# Patient Record
Sex: MALE | Race: BLACK OR AFRICAN AMERICAN | NOT HISPANIC OR LATINO | Employment: UNEMPLOYED | URBAN - METROPOLITAN AREA
[De-identification: names, ages, dates, MRNs, and addresses within clinical notes are randomized per-mention and may not be internally consistent; named-entity substitution may affect disease eponyms.]

---

## 2024-11-13 ENCOUNTER — HOSPITAL ENCOUNTER (OUTPATIENT)
Facility: HOSPITAL | Age: 54
Setting detail: OBSERVATION
Discharge: HOME | End: 2024-11-14
Attending: EMERGENCY MEDICINE | Admitting: STUDENT IN AN ORGANIZED HEALTH CARE EDUCATION/TRAINING PROGRAM
Payer: OTHER GOVERNMENT

## 2024-11-13 ENCOUNTER — APPOINTMENT (OUTPATIENT)
Dept: GENERAL RADIOLOGY | Facility: HOSPITAL | Age: 54
End: 2024-11-13
Payer: OTHER GOVERNMENT

## 2024-11-13 ENCOUNTER — APPOINTMENT (OUTPATIENT)
Dept: CT IMAGING | Facility: HOSPITAL | Age: 54
End: 2024-11-13
Payer: OTHER GOVERNMENT

## 2024-11-13 DIAGNOSIS — T40.601A OPIATE OVERDOSE, ACCIDENTAL OR UNINTENTIONAL, INITIAL ENCOUNTER: Primary | ICD-10-CM

## 2024-11-13 DIAGNOSIS — S01.81XA LACERATION OF FOREHEAD, INITIAL ENCOUNTER: ICD-10-CM

## 2024-11-13 DIAGNOSIS — R55 SYNCOPE, UNSPECIFIED SYNCOPE TYPE: ICD-10-CM

## 2024-11-13 DIAGNOSIS — M54.50 ACUTE MIDLINE LOW BACK PAIN WITHOUT SCIATICA: ICD-10-CM

## 2024-11-13 LAB
ALBUMIN SERPL-MCNC: 3.6 G/DL (ref 3.5–5.2)
ALBUMIN/GLOB SERPL: 1 G/DL
ALP SERPL-CCNC: 78 U/L (ref 39–117)
ALT SERPL W P-5'-P-CCNC: 17 U/L (ref 1–41)
AMPHET+METHAMPHET UR QL: NEGATIVE
ANION GAP SERPL CALCULATED.3IONS-SCNC: 10.4 MMOL/L (ref 5–15)
APAP SERPL-MCNC: <5 MCG/ML (ref 0–30)
ARTERIAL PATENCY WRIST A: POSITIVE
AST SERPL-CCNC: 27 U/L (ref 1–40)
ATMOSPHERIC PRESS: 749.6 MMHG
BACTERIA UR QL AUTO: ABNORMAL /HPF
BARBITURATES UR QL SCN: NEGATIVE
BASE EXCESS BLDA CALC-SCNC: 4.8 MMOL/L (ref 0–2)
BASOPHILS # BLD MANUAL: 0 10*3/MM3 (ref 0–0.2)
BASOPHILS NFR BLD MANUAL: 0 % (ref 0–1.5)
BDY SITE: ABNORMAL
BENZODIAZ UR QL SCN: NEGATIVE
BILIRUB SERPL-MCNC: 0.2 MG/DL (ref 0–1.2)
BILIRUB UR QL STRIP: NEGATIVE
BUN SERPL-MCNC: 12 MG/DL (ref 6–20)
BUN/CREAT SERPL: 15.6 (ref 7–25)
CALCIUM SPEC-SCNC: 8.9 MG/DL (ref 8.6–10.5)
CANNABINOIDS SERPL QL: POSITIVE
CHLORIDE SERPL-SCNC: 99 MMOL/L (ref 98–107)
CLARITY UR: CLEAR
CO2 BLDA-SCNC: 31.9 MMOL/L (ref 23–27)
CO2 SERPL-SCNC: 26.6 MMOL/L (ref 22–29)
COCAINE UR QL: NEGATIVE
COLOR UR: YELLOW
CREAT SERPL-MCNC: 0.77 MG/DL (ref 0.76–1.27)
CRP SERPL-MCNC: 9.3 MG/DL (ref 0–0.5)
D DIMER PPP FEU-MCNC: 2.62 MCGFEU/ML (ref 0–0.53)
D-LACTATE SERPL-SCNC: 3 MMOL/L (ref 0.5–2)
DEPRECATED RDW RBC AUTO: 45.3 FL (ref 37–54)
DEVICE COMMENT: ABNORMAL
EGFRCR SERPLBLD CKD-EPI 2021: 107.1 ML/MIN/1.73
EOSINOPHIL # BLD MANUAL: 0.56 10*3/MM3 (ref 0–0.4)
EOSINOPHIL NFR BLD MANUAL: 3.1 % (ref 0.3–6.2)
ERYTHROCYTE [DISTWIDTH] IN BLOOD BY AUTOMATED COUNT: 13.2 % (ref 12.3–15.4)
ERYTHROCYTE [SEDIMENTATION RATE] IN BLOOD: 43 MM/HR (ref 0–20)
ETHANOL BLD-MCNC: <10 MG/DL (ref 0–10)
ETHANOL UR QL: <0.01 %
FENTANYL UR-MCNC: POSITIVE NG/ML
GAS FLOW AIRWAY: 2 LPM
GEN 5 2HR TROPONIN T REFLEX: 12 NG/L
GLOBULIN UR ELPH-MCNC: 3.6 GM/DL
GLUCOSE SERPL-MCNC: 153 MG/DL (ref 65–99)
GLUCOSE UR STRIP-MCNC: NEGATIVE MG/DL
HCO3 BLDA-SCNC: 30.4 MMOL/L (ref 22–28)
HCT VFR BLD AUTO: 31 % (ref 37.5–51)
HEMODILUTION: NO
HGB BLD-MCNC: 10.2 G/DL (ref 13–17.7)
HGB UR QL STRIP.AUTO: ABNORMAL
HYALINE CASTS UR QL AUTO: ABNORMAL /LPF
KETONES UR QL STRIP: NEGATIVE
LEUKOCYTE ESTERASE UR QL STRIP.AUTO: NEGATIVE
LYMPHOCYTES # BLD MANUAL: 0.74 10*3/MM3 (ref 0.7–3.1)
LYMPHOCYTES NFR BLD MANUAL: 5.1 % (ref 5–12)
MCH RBC QN AUTO: 30.9 PG (ref 26.6–33)
MCHC RBC AUTO-ENTMCNC: 32.9 G/DL (ref 31.5–35.7)
MCV RBC AUTO: 93.9 FL (ref 79–97)
METHADONE UR QL SCN: NEGATIVE
MODALITY: ABNORMAL
MONOCYTES # BLD: 0.93 10*3/MM3 (ref 0.1–0.9)
NEUTROPHILS # BLD AUTO: 15.95 10*3/MM3 (ref 1.7–7)
NEUTROPHILS NFR BLD MANUAL: 87.8 % (ref 42.7–76)
NITRITE UR QL STRIP: NEGATIVE
OPIATES UR QL: NEGATIVE
OXYCODONE UR QL SCN: POSITIVE
PCO2 BLDA: 49.2 MM HG (ref 35–45)
PH BLDA: 7.4 PH UNITS (ref 7.35–7.45)
PH UR STRIP.AUTO: <=5 [PH] (ref 5–8)
PLAT MORPH BLD: NORMAL
PLATELET # BLD AUTO: 465 10*3/MM3 (ref 140–450)
PMV BLD AUTO: 8.7 FL (ref 6–12)
PO2 BLDA: 101.3 MM HG (ref 80–100)
POLYCHROMASIA BLD QL SMEAR: ABNORMAL
POTASSIUM SERPL-SCNC: 4.3 MMOL/L (ref 3.5–5.2)
PROT SERPL-MCNC: 7.2 G/DL (ref 6–8.5)
PROT UR QL STRIP: ABNORMAL
QT INTERVAL: 347 MS
QTC INTERVAL: 483 MS
RBC # BLD AUTO: 3.3 10*6/MM3 (ref 4.14–5.8)
RBC # UR STRIP: ABNORMAL /HPF
REF LAB TEST METHOD: ABNORMAL
SALICYLATES SERPL-MCNC: <0.3 MG/DL
SAO2 % BLDCOA: 97.7 % (ref 92–98.5)
SODIUM SERPL-SCNC: 136 MMOL/L (ref 136–145)
SP GR UR STRIP: >1.03 (ref 1–1.03)
SQUAMOUS #/AREA URNS HPF: ABNORMAL /HPF
TOTAL RATE: 20 BREATHS/MINUTE
TROPONIN T DELTA: 1 NG/L
TROPONIN T SERPL HS-MCNC: 11 NG/L
UROBILINOGEN UR QL STRIP: ABNORMAL
VARIANT LYMPHS NFR BLD MANUAL: 4.1 % (ref 19.6–45.3)
WBC # UR STRIP: ABNORMAL /HPF
WBC MORPH BLD: NORMAL
WBC NRBC COR # BLD AUTO: 18.17 10*3/MM3 (ref 3.4–10.8)

## 2024-11-13 PROCEDURE — 80179 DRUG ASSAY SALICYLATE: CPT | Performed by: PHYSICIAN ASSISTANT

## 2024-11-13 PROCEDURE — 80307 DRUG TEST PRSMV CHEM ANLYZR: CPT | Performed by: PHYSICIAN ASSISTANT

## 2024-11-13 PROCEDURE — 93005 ELECTROCARDIOGRAM TRACING: CPT | Performed by: PHYSICIAN ASSISTANT

## 2024-11-13 PROCEDURE — 82803 BLOOD GASES ANY COMBINATION: CPT | Performed by: PHYSICIAN ASSISTANT

## 2024-11-13 PROCEDURE — 84484 ASSAY OF TROPONIN QUANT: CPT | Performed by: PHYSICIAN ASSISTANT

## 2024-11-13 PROCEDURE — 25510000001 IOPAMIDOL PER 1 ML: Performed by: PHYSICIAN ASSISTANT

## 2024-11-13 PROCEDURE — 85007 BL SMEAR W/DIFF WBC COUNT: CPT | Performed by: PHYSICIAN ASSISTANT

## 2024-11-13 PROCEDURE — 96374 THER/PROPH/DIAG INJ IV PUSH: CPT

## 2024-11-13 PROCEDURE — 86140 C-REACTIVE PROTEIN: CPT | Performed by: PHYSICIAN ASSISTANT

## 2024-11-13 PROCEDURE — 83605 ASSAY OF LACTIC ACID: CPT | Performed by: PHYSICIAN ASSISTANT

## 2024-11-13 PROCEDURE — 36415 COLL VENOUS BLD VENIPUNCTURE: CPT | Performed by: PHYSICIAN ASSISTANT

## 2024-11-13 PROCEDURE — 72110 X-RAY EXAM L-2 SPINE 4/>VWS: CPT

## 2024-11-13 PROCEDURE — 70450 CT HEAD/BRAIN W/O DYE: CPT

## 2024-11-13 PROCEDURE — 85379 FIBRIN DEGRADATION QUANT: CPT | Performed by: PHYSICIAN ASSISTANT

## 2024-11-13 PROCEDURE — 72072 X-RAY EXAM THORAC SPINE 3VWS: CPT

## 2024-11-13 PROCEDURE — 80053 COMPREHEN METABOLIC PANEL: CPT | Performed by: PHYSICIAN ASSISTANT

## 2024-11-13 PROCEDURE — 96375 TX/PRO/DX INJ NEW DRUG ADDON: CPT

## 2024-11-13 PROCEDURE — G0378 HOSPITAL OBSERVATION PER HR: HCPCS

## 2024-11-13 PROCEDURE — 25010000002 DROPERIDOL PER 5 MG: Performed by: PHYSICIAN ASSISTANT

## 2024-11-13 PROCEDURE — 25010000002 ONDANSETRON PER 1 MG: Performed by: PHYSICIAN ASSISTANT

## 2024-11-13 PROCEDURE — 36600 WITHDRAWAL OF ARTERIAL BLOOD: CPT | Performed by: PHYSICIAN ASSISTANT

## 2024-11-13 PROCEDURE — 93010 ELECTROCARDIOGRAM REPORT: CPT | Performed by: INTERNAL MEDICINE

## 2024-11-13 PROCEDURE — 25010000002 LIDOCAINE 1% - EPINEPHRINE 1:100000 1 %-1:100000 SOLUTION: Performed by: PHYSICIAN ASSISTANT

## 2024-11-13 PROCEDURE — 82077 ASSAY SPEC XCP UR&BREATH IA: CPT | Performed by: EMERGENCY MEDICINE

## 2024-11-13 PROCEDURE — 80143 DRUG ASSAY ACETAMINOPHEN: CPT | Performed by: PHYSICIAN ASSISTANT

## 2024-11-13 PROCEDURE — 81001 URINALYSIS AUTO W/SCOPE: CPT | Performed by: PHYSICIAN ASSISTANT

## 2024-11-13 PROCEDURE — 85652 RBC SED RATE AUTOMATED: CPT | Performed by: PHYSICIAN ASSISTANT

## 2024-11-13 PROCEDURE — 99285 EMERGENCY DEPT VISIT HI MDM: CPT

## 2024-11-13 PROCEDURE — 85025 COMPLETE CBC W/AUTO DIFF WBC: CPT | Performed by: PHYSICIAN ASSISTANT

## 2024-11-13 PROCEDURE — 87040 BLOOD CULTURE FOR BACTERIA: CPT | Performed by: PHYSICIAN ASSISTANT

## 2024-11-13 PROCEDURE — 25010000002 NALOXONE PER 1 MG

## 2024-11-13 PROCEDURE — 25810000003 SODIUM CHLORIDE 0.9 % SOLUTION: Performed by: PHYSICIAN ASSISTANT

## 2024-11-13 PROCEDURE — 71275 CT ANGIOGRAPHY CHEST: CPT

## 2024-11-13 PROCEDURE — 96361 HYDRATE IV INFUSION ADD-ON: CPT

## 2024-11-13 RX ORDER — POLYETHYLENE GLYCOL 3350 17 G/17G
17 POWDER, FOR SOLUTION ORAL DAILY PRN
Status: DISCONTINUED | OUTPATIENT
Start: 2024-11-13 | End: 2024-11-14 | Stop reason: HOSPADM

## 2024-11-13 RX ORDER — DROPERIDOL 2.5 MG/ML
2.5 INJECTION, SOLUTION INTRAMUSCULAR; INTRAVENOUS ONCE
Status: COMPLETED | OUTPATIENT
Start: 2024-11-13 | End: 2024-11-13

## 2024-11-13 RX ORDER — NITROGLYCERIN 0.4 MG/1
0.4 TABLET SUBLINGUAL
Status: DISCONTINUED | OUTPATIENT
Start: 2024-11-13 | End: 2024-11-14 | Stop reason: HOSPADM

## 2024-11-13 RX ORDER — BISACODYL 5 MG/1
5 TABLET, DELAYED RELEASE ORAL DAILY PRN
Status: DISCONTINUED | OUTPATIENT
Start: 2024-11-13 | End: 2024-11-14 | Stop reason: HOSPADM

## 2024-11-13 RX ORDER — ACETAMINOPHEN 650 MG/1
650 SUPPOSITORY RECTAL EVERY 4 HOURS PRN
Status: DISCONTINUED | OUTPATIENT
Start: 2024-11-13 | End: 2024-11-14 | Stop reason: HOSPADM

## 2024-11-13 RX ORDER — ACETAMINOPHEN 160 MG/5ML
650 SOLUTION ORAL EVERY 4 HOURS PRN
Status: DISCONTINUED | OUTPATIENT
Start: 2024-11-13 | End: 2024-11-14 | Stop reason: HOSPADM

## 2024-11-13 RX ORDER — NALOXONE HYDROCHLORIDE 0.4 MG/ML
INJECTION, SOLUTION INTRAMUSCULAR; INTRAVENOUS; SUBCUTANEOUS
Status: COMPLETED
Start: 2024-11-13 | End: 2024-11-13

## 2024-11-13 RX ORDER — NALOXONE HCL 0.4 MG/ML
1 VIAL (ML) INJECTION ONCE
Status: COMPLETED | OUTPATIENT
Start: 2024-11-13 | End: 2024-11-13

## 2024-11-13 RX ORDER — AMOXICILLIN 250 MG
2 CAPSULE ORAL 2 TIMES DAILY PRN
Status: DISCONTINUED | OUTPATIENT
Start: 2024-11-13 | End: 2024-11-14 | Stop reason: HOSPADM

## 2024-11-13 RX ORDER — SODIUM CHLORIDE 0.9 % (FLUSH) 0.9 %
10 SYRINGE (ML) INJECTION AS NEEDED
Status: DISCONTINUED | OUTPATIENT
Start: 2024-11-13 | End: 2024-11-14 | Stop reason: HOSPADM

## 2024-11-13 RX ORDER — IOPAMIDOL 755 MG/ML
100 INJECTION, SOLUTION INTRAVASCULAR
Status: COMPLETED | OUTPATIENT
Start: 2024-11-13 | End: 2024-11-13

## 2024-11-13 RX ORDER — ONDANSETRON 2 MG/ML
4 INJECTION INTRAMUSCULAR; INTRAVENOUS ONCE
Status: COMPLETED | OUTPATIENT
Start: 2024-11-13 | End: 2024-11-13

## 2024-11-13 RX ORDER — ONDANSETRON 4 MG/1
4 TABLET, ORALLY DISINTEGRATING ORAL EVERY 6 HOURS PRN
Status: DISCONTINUED | OUTPATIENT
Start: 2024-11-13 | End: 2024-11-14 | Stop reason: HOSPADM

## 2024-11-13 RX ORDER — ONDANSETRON 2 MG/ML
4 INJECTION INTRAMUSCULAR; INTRAVENOUS EVERY 6 HOURS PRN
Status: DISCONTINUED | OUTPATIENT
Start: 2024-11-13 | End: 2024-11-14 | Stop reason: HOSPADM

## 2024-11-13 RX ORDER — ALUMINA, MAGNESIA, AND SIMETHICONE 2400; 2400; 240 MG/30ML; MG/30ML; MG/30ML
15 SUSPENSION ORAL EVERY 6 HOURS PRN
Status: DISCONTINUED | OUTPATIENT
Start: 2024-11-13 | End: 2024-11-14 | Stop reason: HOSPADM

## 2024-11-13 RX ORDER — LIDOCAINE HYDROCHLORIDE AND EPINEPHRINE 10; 10 MG/ML; UG/ML
10 INJECTION, SOLUTION INFILTRATION; PERINEURAL ONCE
Status: COMPLETED | OUTPATIENT
Start: 2024-11-13 | End: 2024-11-13

## 2024-11-13 RX ORDER — ACETAMINOPHEN 325 MG/1
650 TABLET ORAL EVERY 4 HOURS PRN
Status: DISCONTINUED | OUTPATIENT
Start: 2024-11-13 | End: 2024-11-14 | Stop reason: HOSPADM

## 2024-11-13 RX ORDER — BISACODYL 10 MG
10 SUPPOSITORY, RECTAL RECTAL DAILY PRN
Status: DISCONTINUED | OUTPATIENT
Start: 2024-11-13 | End: 2024-11-14 | Stop reason: HOSPADM

## 2024-11-13 RX ADMIN — SODIUM CHLORIDE 1000 ML: 9 INJECTION, SOLUTION INTRAVENOUS at 22:23

## 2024-11-13 RX ADMIN — LIDOCAINE HYDROCHLORIDE AND EPINEPHRINE 10 ML: 10; 10 INJECTION, SOLUTION INFILTRATION; PERINEURAL at 20:10

## 2024-11-13 RX ADMIN — Medication 0.4 MCG/ML: at 20:54

## 2024-11-13 RX ADMIN — NALOXONE HYDROCHLORIDE 0.4 MCG/ML: 0.4 INJECTION, SOLUTION INTRAMUSCULAR; INTRAVENOUS; SUBCUTANEOUS at 20:54

## 2024-11-13 RX ADMIN — DROPERIDOL 2.5 MG: 2.5 INJECTION, SOLUTION INTRAMUSCULAR; INTRAVENOUS at 22:22

## 2024-11-13 RX ADMIN — Medication 3 ML: at 20:08

## 2024-11-13 RX ADMIN — ONDANSETRON 4 MG: 2 INJECTION INTRAMUSCULAR; INTRAVENOUS at 20:54

## 2024-11-13 RX ADMIN — IOPAMIDOL 95 ML: 755 INJECTION, SOLUTION INTRAVENOUS at 19:42

## 2024-11-14 VITALS
HEIGHT: 74 IN | SYSTOLIC BLOOD PRESSURE: 137 MMHG | OXYGEN SATURATION: 98 % | TEMPERATURE: 97.3 F | HEART RATE: 106 BPM | BODY MASS INDEX: 34.53 KG/M2 | DIASTOLIC BLOOD PRESSURE: 94 MMHG | RESPIRATION RATE: 18 BRPM | WEIGHT: 269.1 LBS
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
## 2024-11-14 PROBLEM — E78.5 HYPERLIPIDEMIA: Status: ACTIVE | Noted: 2024-11-14

## 2024-11-14 PROBLEM — I10 ESSENTIAL HYPERTENSION: Status: ACTIVE | Noted: 2024-11-14

## 2024-11-14 PROBLEM — E66.9 OBESITY (BMI 30-39.9): Status: ACTIVE | Noted: 2024-11-14

## 2024-11-14 PROBLEM — R31.29 MICROSCOPIC HEMATURIA: Status: ACTIVE | Noted: 2024-11-14

## 2024-11-14 PROBLEM — D72.829 LEUKOCYTOSIS: Status: ACTIVE | Noted: 2024-11-14

## 2024-11-14 PROBLEM — E87.20 LACTIC ACIDOSIS: Status: RESOLVED | Noted: 2024-11-14 | Resolved: 2024-11-14

## 2024-11-14 PROBLEM — D64.9 ANEMIA: Status: ACTIVE | Noted: 2024-11-14

## 2024-11-14 PROBLEM — E87.20 LACTIC ACIDOSIS: Status: ACTIVE | Noted: 2024-11-14
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
## 2024-11-14 PROBLEM — E11.9 TYPE 2 DIABETES MELLITUS: Status: ACTIVE | Noted: 2024-11-14

## 2024-11-14 PROBLEM — S01.81XA FACIAL LACERATION: Status: ACTIVE | Noted: 2024-11-14

## 2024-11-14 PROBLEM — T40.601A OPIATE OVERDOSE: Status: RESOLVED | Noted: 2024-11-13 | Resolved: 2024-11-14

## 2024-11-14 PROBLEM — F12.10 MARIJUANA ABUSE: Status: ACTIVE | Noted: 2024-11-14

## 2024-11-14 LAB
ANION GAP SERPL CALCULATED.3IONS-SCNC: 10.8 MMOL/L (ref 5–15)
BUN SERPL-MCNC: 9 MG/DL (ref 6–20)
BUN/CREAT SERPL: 13.8 (ref 7–25)
CALCIUM SPEC-SCNC: 8.6 MG/DL (ref 8.6–10.5)
CHLORIDE SERPL-SCNC: 97 MMOL/L (ref 98–107)
CO2 SERPL-SCNC: 27.2 MMOL/L (ref 22–29)
CREAT SERPL-MCNC: 0.65 MG/DL (ref 0.76–1.27)
D-LACTATE SERPL-SCNC: 1.1 MMOL/L (ref 0.5–2)
DEPRECATED RDW RBC AUTO: 45 FL (ref 37–54)
EGFRCR SERPLBLD CKD-EPI 2021: 112.7 ML/MIN/1.73
ERYTHROCYTE [DISTWIDTH] IN BLOOD BY AUTOMATED COUNT: 13.2 % (ref 12.3–15.4)
FERRITIN SERPL-MCNC: 293 NG/ML (ref 30–400)
GLUCOSE SERPL-MCNC: 148 MG/DL (ref 65–99)
HCT VFR BLD AUTO: 26 % (ref 37.5–51)
HGB BLD-MCNC: 8.8 G/DL (ref 13–17.7)
IRON 24H UR-MRATE: 43 MCG/DL (ref 59–158)
IRON SATN MFR SERPL: 15 % (ref 20–50)
MCH RBC QN AUTO: 31.7 PG (ref 26.6–33)
MCHC RBC AUTO-ENTMCNC: 33.8 G/DL (ref 31.5–35.7)
MCV RBC AUTO: 93.5 FL (ref 79–97)
PLATELET # BLD AUTO: 346 10*3/MM3 (ref 140–450)
PMV BLD AUTO: 8.3 FL (ref 6–12)
POTASSIUM SERPL-SCNC: 4 MMOL/L (ref 3.5–5.2)
RBC # BLD AUTO: 2.78 10*6/MM3 (ref 4.14–5.8)
SODIUM SERPL-SCNC: 135 MMOL/L (ref 136–145)
TIBC SERPL-MCNC: 295 MCG/DL (ref 298–536)
TRANSFERRIN SERPL-MCNC: 198 MG/DL (ref 200–360)
WBC NRBC COR # BLD AUTO: 16.03 10*3/MM3 (ref 3.4–10.8)

## 2024-11-14 PROCEDURE — G0378 HOSPITAL OBSERVATION PER HR: HCPCS

## 2024-11-14 PROCEDURE — 84466 ASSAY OF TRANSFERRIN: CPT | Performed by: STUDENT IN AN ORGANIZED HEALTH CARE EDUCATION/TRAINING PROGRAM

## 2024-11-14 PROCEDURE — 82728 ASSAY OF FERRITIN: CPT | Performed by: STUDENT IN AN ORGANIZED HEALTH CARE EDUCATION/TRAINING PROGRAM

## 2024-11-14 PROCEDURE — 83540 ASSAY OF IRON: CPT | Performed by: STUDENT IN AN ORGANIZED HEALTH CARE EDUCATION/TRAINING PROGRAM

## 2024-11-14 PROCEDURE — 85027 COMPLETE CBC AUTOMATED: CPT | Performed by: NURSE PRACTITIONER

## 2024-11-14 PROCEDURE — 83605 ASSAY OF LACTIC ACID: CPT | Performed by: PHYSICIAN ASSISTANT

## 2024-11-14 PROCEDURE — 87040 BLOOD CULTURE FOR BACTERIA: CPT | Performed by: STUDENT IN AN ORGANIZED HEALTH CARE EDUCATION/TRAINING PROGRAM

## 2024-11-14 PROCEDURE — 80048 BASIC METABOLIC PNL TOTAL CA: CPT | Performed by: NURSE PRACTITIONER

## 2024-11-14 RX ORDER — ATORVASTATIN CALCIUM 20 MG/1
10 TABLET, FILM COATED ORAL DAILY
Status: DISCONTINUED | OUTPATIENT
Start: 2024-11-14 | End: 2024-11-14 | Stop reason: HOSPADM

## 2024-11-14 RX ORDER — INSULIN LISPRO 100 [IU]/ML
2-7 INJECTION, SOLUTION INTRAVENOUS; SUBCUTANEOUS
Status: DISCONTINUED | OUTPATIENT
Start: 2024-11-14 | End: 2024-11-14 | Stop reason: HOSPADM

## 2024-11-14 RX ORDER — ATORVASTATIN CALCIUM 10 MG/1
10 TABLET, FILM COATED ORAL DAILY
COMMUNITY

## 2024-11-14 RX ORDER — IBUPROFEN 600 MG/1
1 TABLET ORAL
Status: DISCONTINUED | OUTPATIENT
Start: 2024-11-14 | End: 2024-11-14 | Stop reason: HOSPADM

## 2024-11-14 RX ORDER — AMOXICILLIN 250 MG
2 CAPSULE ORAL DAILY
Qty: 60 TABLET | Refills: 0 | Status: SHIPPED | OUTPATIENT
Start: 2024-11-14 | End: 2024-12-14

## 2024-11-14 RX ORDER — LISINOPRIL 10 MG/1
5 TABLET ORAL DAILY
Status: DISCONTINUED | OUTPATIENT
Start: 2024-11-14 | End: 2024-11-14 | Stop reason: HOSPADM

## 2024-11-14 RX ORDER — DULOXETIN HYDROCHLORIDE 30 MG/1
30 CAPSULE, DELAYED RELEASE ORAL DAILY
Status: DISCONTINUED | OUTPATIENT
Start: 2024-11-14 | End: 2024-11-14 | Stop reason: HOSPADM

## 2024-11-14 RX ORDER — CHOLECALCIFEROL (VITAMIN D3) 25 MCG
1000 TABLET ORAL DAILY
COMMUNITY

## 2024-11-14 RX ORDER — AMITRIPTYLINE HYDROCHLORIDE 50 MG/1
50 TABLET ORAL NIGHTLY
COMMUNITY

## 2024-11-14 RX ORDER — BACLOFEN 20 MG/1
20 TABLET ORAL 3 TIMES DAILY PRN
Status: ON HOLD | COMMUNITY
End: 2024-11-14

## 2024-11-14 RX ORDER — GABAPENTIN 800 MG/1
800 TABLET ORAL 3 TIMES DAILY
COMMUNITY

## 2024-11-14 RX ORDER — FOLIC ACID 1 MG/1
1 TABLET ORAL DAILY
Status: DISCONTINUED | OUTPATIENT
Start: 2024-11-14 | End: 2024-11-14 | Stop reason: HOSPADM

## 2024-11-14 RX ORDER — FOLIC ACID 1 MG/1
1 TABLET ORAL DAILY
COMMUNITY

## 2024-11-14 RX ORDER — NICOTINE POLACRILEX 4 MG
15 LOZENGE BUCCAL
Status: DISCONTINUED | OUTPATIENT
Start: 2024-11-14 | End: 2024-11-14 | Stop reason: HOSPADM

## 2024-11-14 RX ORDER — LISINOPRIL 10 MG/1
5 TABLET ORAL DAILY
COMMUNITY

## 2024-11-14 RX ORDER — DEXTROSE MONOHYDRATE 25 G/50ML
25 INJECTION, SOLUTION INTRAVENOUS
Status: DISCONTINUED | OUTPATIENT
Start: 2024-11-14 | End: 2024-11-14 | Stop reason: HOSPADM

## 2024-11-14 RX ORDER — NALOXONE HCL 0.4 MG/ML
0.4 VIAL (ML) INJECTION
Status: DISCONTINUED | OUTPATIENT
Start: 2024-11-14 | End: 2024-11-14 | Stop reason: HOSPADM

## 2024-11-14 RX ORDER — OXYCODONE AND ACETAMINOPHEN 7.5; 325 MG/1; MG/1
1 TABLET ORAL EVERY 4 HOURS PRN
Status: DISCONTINUED | OUTPATIENT
Start: 2024-11-14 | End: 2024-11-14 | Stop reason: HOSPADM

## 2024-11-14 RX ORDER — ENOXAPARIN SODIUM 100 MG/ML
40 INJECTION SUBCUTANEOUS NIGHTLY
Status: DISCONTINUED | OUTPATIENT
Start: 2024-11-14 | End: 2024-11-14 | Stop reason: HOSPADM

## 2024-11-14 RX ORDER — BACLOFEN 20 MG/1
20 TABLET ORAL 2 TIMES DAILY PRN
Start: 2024-11-14

## 2024-11-14 RX ORDER — OXYCODONE AND ACETAMINOPHEN 7.5; 325 MG/1; MG/1
1 TABLET ORAL EVERY 4 HOURS PRN
COMMUNITY

## 2024-11-14 RX ORDER — DULOXETIN HYDROCHLORIDE 30 MG/1
30 CAPSULE, DELAYED RELEASE ORAL DAILY
COMMUNITY

## 2024-11-14 RX ADMIN — OXYCODONE AND ACETAMINOPHEN 1 TABLET: 7.5; 325 TABLET ORAL at 10:37

## 2024-11-14 NOTE — H&P
Patient Name:  Oracio Spencer  YOB: 1970  MRN:  [id number]  Admit Date:  11/13/2024  Patient Care Team:  Provider, No Known as PCP - General      Subjective   History Present Illness     Chief Complaint   Patient presents with    Fall    Facial Laceration       Mr. Spencer is a 54 y.o. man with type 2 diabetes, essential hypertension, hyperlipidemia, a history of chronic back pain s/p posterior fusion from T9 to the pelvis on 11/7 and then an anterior fusion at L5-S1 on 11/8 at [facility] who presents following a fall resulting in a facial laceration. Patient was obtunded on arrival and responded to narcan. The laceration was repaired in the ED. This morning, he is awake and alert and complaining of pain in his back from surgery. He is upset that he wasn't given food until this morning. He is also upset that he's been accused of using fentanyl. He wants pain medications and he wants to go home. He denies any infectious symptoms of any kind. He tells me that he was taking 2 percocet every 4 hours at home and didn't realize that he had been prescribed 1 every 4 hours as needed. He also tells me that he may have been doubling up on muscle relaxants. He vehemently denies fentanyl use. He does endorse marijuana use. He does not think that it's possible that the marijuana could be laced.     History of Present Illness  Review of Systems   Constitutional:  Negative for chills, diaphoresis, fatigue and fever.   HENT:  Negative for postnasal drip, rhinorrhea and sinus pressure.    Eyes: Negative.    Respiratory:  Negative for cough and shortness of breath.    Cardiovascular:  Negative for chest pain and palpitations.   Gastrointestinal:  Negative for abdominal pain, diarrhea, nausea and vomiting.   Endocrine: Negative.    Genitourinary:  Negative for dysuria, flank pain, frequency and urgency.   Musculoskeletal:  Positive for back pain. Negative for arthralgias.   Skin:  Negative for rash and wound.    Allergic/Immunologic: Negative.    Neurological:  Negative for weakness, light-headedness and headaches.   Hematological: Negative.    Psychiatric/Behavioral:  Negative for confusion and decreased concentration.         Personal History     History reviewed. No pertinent past medical history.  History reviewed. No pertinent surgical history.  History reviewed. No pertinent family history.  Social History     Tobacco Use    Smoking status: Never     Passive exposure: Past    Smokeless tobacco: Never    Tobacco comments:     Denies    Vaping Use    Vaping status: Never Used   Substance Use Topics    Alcohol use: Never     Comment: pt denies    Drug use: Yes     Types: Marijuana     No current facility-administered medications on file prior to encounter.     Current Outpatient Medications on File Prior to Encounter   Medication Sig Dispense Refill    amitriptyline (ELAVIL) 50 MG tablet Take 1 tablet by mouth Every Night.      atorvastatin (LIPITOR) 10 MG tablet Take 1 tablet by mouth Daily.      baclofen (LIORESAL) 20 MG tablet Take 1 tablet by mouth 3 (Three) Times a Day As Needed for Muscle Spasms.      Cholecalciferol 25 MCG (1000 UT) tablet Take 1 tablet by mouth Daily.      DULoxetine (CYMBALTA) 30 MG capsule Take 1 capsule by mouth Daily.      folic acid (FOLVITE) 1 MG tablet Take 1 tablet by mouth Daily.      gabapentin (NEURONTIN) 800 MG tablet Take 1 tablet by mouth 3 (Three) Times a Day.      lisinopril (PRINIVIL,ZESTRIL) 10 MG tablet Take 0.5 tablets by mouth Daily.      melatonin 1 MG tablet Take 3 tablets by mouth At Night As Needed for Sleep.      oxyCODONE-acetaminophen (PERCOCET) 7.5-325 MG per tablet Take 1 tablet by mouth Every 4 (Four) Hours As Needed for Moderate Pain. Max 6  tablets a day       No Known Allergies    Objective    Objective     Vital Signs  Temp:  [97.3 °F (36.3 °C)-98.2 °F (36.8 °C)] 97.3 °F (36.3 °C)  Heart Rate:  [103-126] 106  Resp:  [16-18] 18  BP: ()/(64-96)  137/94  SpO2:  [93 %-100 %] 98 %  on   ;   Device (Oxygen Therapy): room air  Body mass index is 34.55 kg/m².    Physical Exam  Vitals and nursing note reviewed.   Constitutional:       General: He is not in acute distress.     Appearance: He is not toxic-appearing.   HENT:      Head:      Comments: Facial laceration on the bridge of his nose which has been repaired  Eyes:      Extraocular Movements: Extraocular movements intact.      Conjunctiva/sclera: Conjunctivae normal.      Pupils: Pupils are equal, round, and reactive to light.   Cardiovascular:      Rate and Rhythm: Normal rate and regular rhythm.      Heart sounds: Normal heart sounds.   Pulmonary:      Effort: Pulmonary effort is normal.      Breath sounds: Normal breath sounds.   Abdominal:      General: Bowel sounds are normal. There is no distension.      Palpations: Abdomen is soft.      Tenderness: There is no abdominal tenderness. There is no guarding or rebound.   Musculoskeletal:         General: No tenderness.      Cervical back: Normal range of motion and neck supple.      Right lower leg: No edema.      Left lower leg: No edema.   Skin:     General: Skin is warm and dry.      Findings: No erythema or rash.      Comments: I undid his dressing and examined his surgical site on his spine. There is no evidence of cellulitis    Neurological:      Mental Status: He is alert.   Psychiatric:         Mood and Affect: Affect is angry.         Behavior: Behavior is cooperative.         Results Review:  I reviewed the patient's new clinical results.  I reviewed the patient's new imaging results and agree with the interpretation.  I reviewed the patient's other test results and agree with the interpretation  I personally viewed and interpreted the patient's EKG/Telemetry data  Discussed with ED provider.    Lab Results (last 24 hours)       Procedure Component Value Units Date/Time    CBC & Differential [[id number]]  (Abnormal) Collected: 11/13/24 8314     Specimen: Blood Updated: 11/13/24 1923    Narrative:      The following orders were created for panel order CBC & Differential.  Procedure                               Abnormality         Status                     ---------                               -----------         ------                     CBC Auto Differential[[id number]]        Abnormal            Final result                 Please view results for these tests on the individual orders.    Comprehensive Metabolic Panel [[id number]]  (Abnormal) Collected: 11/13/24 1859    Specimen: Blood Updated: 11/13/24 1936     Glucose 153 mg/dL      BUN 12 mg/dL      Creatinine 0.77 mg/dL      Sodium 136 mmol/L      Potassium 4.3 mmol/L      Chloride 99 mmol/L      CO2 26.6 mmol/L      Calcium 8.9 mg/dL      Total Protein 7.2 g/dL      Albumin 3.6 g/dL      ALT (SGPT) 17 U/L      AST (SGOT) 27 U/L      Alkaline Phosphatase 78 U/L      Total Bilirubin 0.2 mg/dL      Globulin 3.6 gm/dL      A/G Ratio 1.0 g/dL      BUN/Creatinine Ratio 15.6     Anion Gap 10.4 mmol/L      eGFR 107.1 mL/min/1.73     Narrative:      GFR Normal >60  Chronic Kidney Disease <60  Kidney Failure <15      High Sensitivity Troponin T [[id number]]  (Normal) Collected: 11/13/24 1859    Specimen: Blood Updated: 11/13/24 1936     HS Troponin T 11 ng/L     Narrative:      High Sensitive Troponin T Reference Range:  <14.0 ng/L- Negative Female for AMI  <22.0 ng/L- Negative Male for AMI  >=14 - Abnormal Female indicating possible myocardial injury.  >=22 - Abnormal Male indicating possible myocardial injury.   Clinicians would have to utilize clinical acumen, EKG, Troponin, and serial changes to determine if it is an Acute Myocardial Infarction or myocardial injury due to an underlying chronic condition.         D-dimer, Quantitative [[id number]]  (Abnormal) Collected: 11/13/24 1859    Specimen: Blood Updated: 11/13/24 1923     D-Dimer, Quantitative 2.62 MCGFEU/mL     Narrative:      According to the assay  "'s published package insert, a normal (<0.50 MCGFEU/mL) D-dimer result in conjunction with a non-high clinical probability assessment, excludes deep vein thrombosis (DVT) and pulmonary embolism (PE) with high sensitivity.    D-dimer values increase with age and this can make VTE exclusion of an older population difficult. To address this, the [employer], based on best available evidence and recent guidelines, recommends that clinicians use age-adjusted D-dimer thresholds in patients greater than 50 years of age with: a) a low probability of PE who do not meet all Pulmonary Embolism Rule Out Criteria, or b) in those with intermediate probability of PE.   The formula for an age-adjusted D-dimer cut-off is \"age/100\".  For example, a 60 year old patient would have an age-adjusted cut-off of 0.60 MCGFEU/mL and an 80 year old 0.80 MCGFEU/mL.    CBC Auto Differential [[id number]]  (Abnormal) Collected: 11/13/24 1859    Specimen: Blood Updated: 11/13/24 1923     WBC 18.17 10*3/mm3      RBC 3.30 10*6/mm3      Hemoglobin 10.2 g/dL      Hematocrit 31.0 %      MCV 93.9 fL      MCH 30.9 pg      MCHC 32.9 g/dL      RDW 13.2 %      RDW-SD 45.3 fl      MPV 8.7 fL      Platelets 465 10*3/mm3     Ethanol [[id number]] Collected: 11/13/24 1859    Specimen: Blood Updated: 11/13/24 1936     Ethanol <10 mg/dL      Ethanol % <0.010 %     Manual Differential [[id number]]  (Abnormal) Collected: 11/13/24 1859    Specimen: Blood Updated: 11/13/24 1942     Neutrophil % 87.8 %      Lymphocyte % 4.1 %      Monocyte % 5.1 %      Eosinophil % 3.1 %      Basophil % 0.0 %      Neutrophils Absolute 15.95 10*3/mm3      Lymphocytes Absolute 0.74 10*3/mm3      Monocytes Absolute 0.93 10*3/mm3      Eosinophils Absolute 0.56 10*3/mm3      Basophils Absolute 0.00 10*3/mm3      Polychromasia Mod/2+     WBC Morphology Normal     Platelet Morphology Normal    Sedimentation Rate [[id number]]  (Abnormal) Collected: 11/13/24 1859    " Specimen: Blood Updated: 11/13/24 2119     Sed Rate 43 mm/hr     C-reactive Protein [[id number]]  (Abnormal) Collected: 11/13/24 1859    Specimen: Blood Updated: 11/13/24 2059     C-Reactive Protein 9.30 mg/dL     Salicylate Level [[id number]]  (Normal) Collected: 11/13/24 1859    Specimen: Blood Updated: 11/13/24 2120     Salicylate <0.3 mg/dL     Narrative:      Therapeutic range for Salicylates:  3.0 - 10.0 mg/dL for antipyretic/analgesic conditions  15.0 - 30.0 mg/dL for anti-inflammatory conditions    Acetaminophen Level [[id number]]  (Normal) Collected: 11/13/24 1859    Specimen: Blood Updated: 11/13/24 2120     Acetaminophen <5.0 mcg/mL     Urinalysis With Microscopic If Indicated (No Culture) - Urine, Clean Catch [[id number]]  (Abnormal) Collected: 11/13/24 1909    Specimen: Urine, Clean Catch Updated: 11/13/24 1926     Color, UA Yellow     Appearance, UA Clear     pH, UA <=5.0     Specific Gravity, UA >1.030     Glucose, UA Negative     Ketones, UA Negative     Bilirubin, UA Negative     Blood, UA Small (1+)     Protein, UA Trace     Leuk Esterase, UA Negative     Nitrite, UA Negative     Urobilinogen, UA 1.0 E.U./dL    Urine Drug Screen - Urine, Clean Catch [[id number]]  (Abnormal) Collected: 11/13/24 1909    Specimen: Urine, Clean Catch Updated: 11/13/24 1944     Amphet/Methamphet, Screen Negative     Barbiturates Screen, Urine Negative     Benzodiazepine Screen, Urine Negative     Cocaine Screen, Urine Negative     Opiate Screen Negative     THC, Screen, Urine Positive     Methadone Screen, Urine Negative     Oxycodone Screen, Urine Positive     Fentanyl, Urine Positive    Narrative:      Negative Thresholds Per Drugs Screened:    Amphetamines                 500 ng/ml  Barbiturates                 200 ng/ml  Benzodiazepines              100 ng/ml  Cocaine                      300 ng/ml  Methadone                    300 ng/ml  Opiates                      300 ng/ml  Oxycodone                    100 ng/ml  THC                            50 ng/ml  Fentanyl                       5 ng/ml      The Normal Value for all drugs tested is negative. This report includes final unconfirmed screening results to be used for medical treatment purposes only. Unconfirmed results must not be used for non-medical purposes such as employment or legal testing. Clinical consideration should be applied to any drug of abuse test, particularly when unconfirmed results are used.            Urinalysis, Microscopic Only - Urine, Clean Catch [[id number]]  (Abnormal) Collected: 11/13/24 1909    Specimen: Urine, Clean Catch Updated: 11/13/24 1926     RBC, UA 21-50 /HPF      WBC, UA 0-2 /HPF      Bacteria, UA None Seen /HPF      Squamous Epithelial Cells, UA 0-2 /HPF      Hyaline Casts, UA 0-2 /LPF      Methodology Automated Microscopy    Blood Gas, Arterial - [[id number]]  (Abnormal) Collected: 11/13/24 2043    Specimen: Arterial Blood Updated: 11/13/24 2045     Site Left Radial     Aníbal's Test Positive     pH, Arterial 7.399 pH units      pCO2, Arterial 49.2 mm Hg      pO2, Arterial 101.3 mm Hg      HCO3, Arterial 30.4 mmol/L      Base Excess, Arterial 4.8 mmol/L      Comment: Serial Number: [id number]:  [id number]        O2 Saturation, Arterial 97.7 %      CO2 Content 31.9 mmol/L      Barometric Pressure for Blood Gas 749.6000 mmHg      Modality Cannula     Flow Rate 2.0000 lpm      Rate 20 Breaths/minute      Hemodilution No     Device Comment sat 100    High Sensitivity Troponin T 2Hr [[id number]]  (Normal) Collected: 11/13/24 2106    Specimen: Blood from Arm, Left Updated: 11/13/24 2139     HS Troponin T 12 ng/L      Troponin T Delta 1 ng/L     Narrative:      High Sensitive Troponin T Reference Range:  <14.0 ng/L- Negative Female for AMI  <22.0 ng/L- Negative Male for AMI  >=14 - Abnormal Female indicating possible myocardial injury.  >=22 - Abnormal Male indicating possible myocardial injury.   Clinicians would have to utilize clinical acumen, EKG,  Troponin, and serial changes to determine if it is an Acute Myocardial Infarction or myocardial injury due to an underlying chronic condition.         Lactic Acid, Plasma [[id number]]  (Abnormal) Collected: 11/13/24 2106    Specimen: Blood from Arm, Left Updated: 11/13/24 2138     Lactate 3.0 mmol/L     Blood Culture - Blood, Arm, Left [[id number]] Collected: 11/13/24 2106    Specimen: Blood from Arm, Left Updated: 11/13/24 2114    Basic Metabolic Panel [[id number]]  (Abnormal) Collected: 11/14/24 0440    Specimen: Blood Updated: 11/14/24 0518     Glucose 148 mg/dL      BUN 9 mg/dL      Creatinine 0.65 mg/dL      Sodium 135 mmol/L      Potassium 4.0 mmol/L      Chloride 97 mmol/L      CO2 27.2 mmol/L      Calcium 8.6 mg/dL      BUN/Creatinine Ratio 13.8     Anion Gap 10.8 mmol/L      eGFR 112.7 mL/min/1.73     Narrative:      GFR Normal >60  Chronic Kidney Disease <60  Kidney Failure <15      STAT Lactic Acid, Reflex [[id number]]  (Normal) Collected: 11/14/24 0441    Specimen: Blood Updated: 11/14/24 0510     Lactate 1.1 mmol/L     CBC (No Diff) [[id number]]  (Abnormal) Collected: 11/14/24 0441    Specimen: Blood Updated: 11/14/24 0453     WBC 16.03 10*3/mm3      RBC 2.78 10*6/mm3      Hemoglobin 8.8 g/dL      Hematocrit 26.0 %      MCV 93.5 fL      MCH 31.7 pg      MCHC 33.8 g/dL      RDW 13.2 %      RDW-SD 45.0 fl      MPV 8.3 fL      Platelets 346 10*3/mm3             Imaging Results (Last 24 Hours)       Procedure Component Value Units Date/Time    XR Spine Thoracic 3 View [[id number]] Collected: 11/13/24 2232     Updated: 11/13/24 2241    Narrative:      3 VIEWS THORACIC SPINE; 4 VIEWS LUMBAR SPINE     HISTORY: Fall with back pain     COMPARISON: November 13, 2024     FINDINGS:  Thoracic spine: No acute fracture or subluxation of the thoracic spine  is seen. The patient is status post posterior spinal fusion, which  extends from T9-S1. There is a changes of anterior cervical spinal  fusion at C3-C6. There is  multilevel discogenic degenerative disease.     Lumbar spine: Again, there is posterior spinal hardware, which extends  from T9-S1. There are further bilateral sacroiliac screws. There are  interbody grafts which are noted at L2-L3, L3-L4, and L5-S1 no acute  fracture or subluxation is seen. No fracture of the hardware is  identified. The patient is noted to have excreted contrast material  within the urinary bladder.       Impression:      No acute fracture or subluxation identified.     This report was finalized on 11/13/2024 10:38 PM by Dr. Betina Blake M.D on Workstation: [id number]       XR Spine Lumbar Complete 4+VW [[id number]] Collected: 11/13/24 2232     Updated: 11/13/24 2241    Narrative:      3 VIEWS THORACIC SPINE; 4 VIEWS LUMBAR SPINE     HISTORY: Fall with back pain     COMPARISON: November 13, 2024     FINDINGS:  Thoracic spine: No acute fracture or subluxation of the thoracic spine  is seen. The patient is status post posterior spinal fusion, which  extends from T9-S1. There is a changes of anterior cervical spinal  fusion at C3-C6. There is multilevel discogenic degenerative disease.     Lumbar spine: Again, there is posterior spinal hardware, which extends  from T9-S1. There are further bilateral sacroiliac screws. There are  interbody grafts which are noted at L2-L3, L3-L4, and L5-S1 no acute  fracture or subluxation is seen. No fracture of the hardware is  identified. The patient is noted to have excreted contrast material  within the urinary bladder.       Impression:      No acute fracture or subluxation identified.     This report was finalized on 11/13/2024 10:38 PM by Dr. Betina Blake M.D on Workstation: [id number]       CT Angiogram Chest Pulmonary Embolism [[id number]] Collected: 11/13/24 2003     Updated: 11/13/24 2010    Narrative:      CT ANGIOGRAM CHEST PULMONARY EMBOLISM-     Radiation dose reduction techniques were utilized, including automated  exposure control and  exposure modulation based on body size.     Clinical: Elevated D-dimer, syncope     CT scan of the chest performed with intravenous contrast, pulmonary  embolus protocol to include coronal, sagittal and three-dimensional  reconstruction.     FINDINGS: Cardiac size upper limits of normal to mildly enlarged. No  aortic aneurysm nor dissection. No pulmonary embolus demonstrated, there  is some respiratory motion artifact. In addition there is artifact  arising from the thoracolumbar fusion hardware. The esophagus is  satisfactory in appearance. There are small calcified benign right hilar  and mediastinal lymph nodes, no adenopathy. There is a minimal amount of  dependent atelectasis at both lung bases, no pleural effusion,  suspicious pulmonary nodule or lung consolidation is demonstrated.  Limited images through the upper abdomen are unremarkable.     CONCLUSION: No pulmonary embolus demonstrated. There is dependent  atelectasis at the lung bases, the lungs are otherwise clear. The  remainder is unremarkable.              This report was finalized on 11/13/2024 8:07 PM by Dr. Rubén Strickland M.D on Workstation: [id number]       CT Head Without Contrast [[id number]] Collected: 11/13/24 1955     Updated: 11/13/24 2001    Narrative:      CT HEAD WO CONTRAST-     INDICATIONS: Head injury     TECHNIQUE: Radiation dose reduction techniques were utilized, including  automated exposure control and exposure modulation based on body size.  Noncontrast head CT     COMPARISON: None available     FINDINGS:     No acute intracranial hemorrhage, midline shift or mass effect. No acute  territorial infarct is identified.     Ventricles, cisterns, cerebral sulci are unremarkable for patient age.     The visualized paranasal sinuses, orbits, mastoid air cells are  unremarkable.          Impression:         No acute intracranial hemorrhage or hydrocephalus. If there is further  clinical concern, MRI could be considered for further  evaluation.     This report was finalized on 11/13/2024 7:57 PM by Dr. Delta Eagle M.D on Workstation: [id number]                   ECG 12 Lead Syncope   Final Result   HEART RFSK=471  bpm   RR Glolxbaz=930  ms   IN Jvugbosn=387  ms   P Horizontal Axis=4  deg   P Front Axis=57  deg   QRSD Interval=84  ms   QT Atubegpo=650  ms   LDdG=142  ms   QRS Axis=26  deg   T Wave Axis=-10  deg   - BORDERLINE ECG -   Sinus tachycardia   Borderline  T abnormalities, inferior leads   Borderline  prolonged QT interval   No Prior Tracing for Comparison   Electronically Signed By: Dajuan Manning ([facility]) 2024-11-13 21:25:53   Date and Time of Study:2024-11-13 19:00:01      Telemetry Scan   Final Result      Telemetry Scan   Final Result      Telemetry Scan   Final Result           Assessment/Plan     Active Hospital Problems    Diagnosis  POA    **Opiate overdose [T40.601A]  Yes    Marijuana abuse [F12.10]  Yes    Microscopic hematuria [R31.29]  Yes    Lactic acidosis [E87.20]  Yes    Leukocytosis [D72.829]  Yes    Fall [W19.XXXA]  Yes    Facial laceration [S01.81XA]  Yes    Anemia [D64.9]  Yes    Essential hypertension [I10]  Yes    Hyperlipidemia [E78.5]  Yes    Type 2 diabetes mellitus [E11.9]  Yes    Obesity (BMI 30-39.9) [E66.9]  Yes      Resolved Hospital Problems   No resolved problems to display.       Mr. Spencer is a 54 y.o. man with type 2 diabetes, essential hypertension, hyperlipidemia, a history of chronic back pain s/p posterior fusion from T9 to the pelvis on 11/7 and then an anterior fusion at L5-S1 on 11/8 at [facility] who presents following a fall resulting in a facial laceration. Patient was obtunded on arrival and responded to narcan. The laceration was repaired in the ED.    Opiate overdose/polypharmacy-it sounds like he was doubling up on his pain medication at home and possibly taking multiple muscle relaxants. We discussed reducing his opiates to 1 percocet q4h prn and reducing the dose of baclofen to bid prn  instead of scheduled and stopping the other muscle relaxant. He was agreeable to this.   Fall with facial laceration-repaired in the ED. Very likely secondary to the above.   Leukocytosis-chest xray and urinalysis are without evidence of infection. Blood cultures are pending. No infectious symptoms and improving spontaneously and so most likely reactive to the above.  Lactic acidosis-improved with IVF  Anemia-stable since surgery  Microscopic hematuria-the sample was concentrated and may be the cause. Regardless, he will need a repeat urinalysis to confirm the hematuria in 3-4 weeks. If persistent, then this can be further evaluated as an outpatient. Discussed this with him at bedside.  Type 2 diabetes-no A1c in the chart, but it was reportedly recently 7.8 based on documentation from when he was at [place]. I don't see any medications on his home list. Utilize sliding scale  Essential hypertension-restart lisinopril  Hyperlipidemia-atorvastatin  Chronic back pain s/p recent posterior thoracolumbar fusion on 11/7/24 followed by an anterior lumbar fusion on 11/8/24 at [facility]-xrays did not show any fractures or subluxations. No evidence of cellulitis on exam. He wants to go home today and I instructed him to follow up with his surgeon within a week.  I discussed the patient's findings and my recommendations with patient and nursing staff.    VTE Prophylaxis - Lovenox 40 mg SC daily.  Code Status - Full code.       Tiago Mendez MD  [facility]  11/14/24  08:33 EST

## 2024-11-14 NOTE — DISCHARGE SUMMARY
Patient Name: Oracio Spencer  : 1970  MRN: [id number]    Date of Admission: 2024  Date of Discharge:  2024  Primary Care Physician: Provider, No Known      Chief Complaint:   Fall and Facial Laceration      Discharge Diagnoses     Active Hospital Problems    Diagnosis  POA    Marijuana abuse [F12.10]  Yes    Microscopic hematuria [R31.29]  Yes    Leukocytosis [D72.829]  Yes    Facial laceration [S01.81XA]  Yes    Anemia [D64.9]  Yes    Essential hypertension [I10]  Yes    Hyperlipidemia [E78.5]  Yes    Type 2 diabetes mellitus [E11.9]  Yes    Obesity (BMI 30-39.9) [E66.9]  Yes      Resolved Hospital Problems    Diagnosis Date Resolved POA    **Opiate overdose [T40.601A] 2024 Yes    Lactic acidosis [E87.20] 2024 Yes    Fall [W19.XXXA] 2024 Yes        Hospital Course     Mr. Spencer is a 54 y.o. male with type 2 diabetes, essential hypertension, hyperlipidemia, a history of chronic back pain s/p posterior fusion from T9 to the pelvis on  and then an anterior fusion at L5-S1 on  at [place] who presents following a fall resulting in a facial laceration. Patient was obtunded on arrival and responded to narcan. The laceration was repaired in the ED. He was found to have an opiate overdose/polypharmacy with oxycodone, fentanyl, and THC in his urine, a significant leukocytosis, a lactic acidosis, stable anemia, microscopic hematuria and so was admitted for further evaluation and treatment.    The following morning, he was back to normal.  He admitted to taking 2 Percocet every 4 hours instead of the prescribed 1 Percocet every 4 as needed because he was receiving 2 pills in the hospital.  He also thought that he might have been doubling up on his muscle relaxants.  He vehemently denied any fentanyl use, but did endorse marijuana use.  We discussed reducing his Percocet back to 1 pill every 4 hours as needed, as well as reducing his baclofen dose, and stopping the other  muscle relaxant.  He was agreeable to this.  I will also prescribe Narcan for him to go home with.  He will need to follow-up with his spine surgeon next week.    His lactic acidosis resolved with IV fluids, and his leukocytosis is trending down and he has no symptoms or signs of infection currently.  I did tell him that if his blood cultures came back positive, that I would call him to return to the hospital, and he was agreeable to this.    For the microscopic hematuria, I think this was probably present because of concentrated urine, but I recommended to him to repeat the urinalysis with his PCP in about a month to make sure that it is resolved.  If persistent, then he will need an outpatient evaluation.  He was agreeable to this.    Day of Discharge     Subjective:  No events overnight.  His pain is uncontrolled now that he has not had any pain medication for about 20 hours.  He was agitated when I first came to see him, but he calmed down when I explained what was going on.  He is anxious for discharge, and has no infectious symptoms and his exam appears unremarkable, so I think he can probably go home today.    Physical Exam:  Temp:  [97.3 °F (36.3 °C)-98.2 °F (36.8 °C)] 97.3 °F (36.3 °C)  Heart Rate:  [103-126] 106  Resp:  [16-18] 18  BP: ()/(64-96) 137/94  Body mass index is 34.55 kg/m².  Physical Exam  Constitutional:       General: He is not in acute distress.     Appearance: He is not toxic-appearing.   HENT:      Head:      Comments: Facial laceration on the bridge of his nose which has been repaired  Eyes:      Extraocular Movements: Extraocular movements intact.      Conjunctiva/sclera: Conjunctivae normal.      Pupils: Pupils are equal, round, and reactive to light.   Cardiovascular:      Rate and Rhythm: Normal rate and regular rhythm.      Heart sounds: Normal heart sounds.   Pulmonary:      Effort: Pulmonary effort is normal.      Breath sounds: Normal breath sounds.   Abdominal:       General: Bowel sounds are normal. There is no distension.      Palpations: Abdomen is soft.      Tenderness: There is no abdominal tenderness. There is no guarding or rebound.   Musculoskeletal:         General: No tenderness.      Cervical back: Normal range of motion and neck supple.      Right lower leg: No edema.      Left lower leg: No edema.   Skin:     General: Skin is warm and dry.      Findings: No erythema or rash.      Comments: I undid his dressing and examined his surgical site on his spine. There is no evidence of cellulitis    Neurological:      Mental Status: He is alert.   Psychiatric:         Mood and Affect: Affect is angry.         Behavior: Behavior is cooperative.   Consultants     Consult Orders (all) (From admission, onward)       Start     Ordered    11/14/24 0833  Inpatient Access Center Consult  Once,   Status:  Canceled        Provider:  (Not yet assigned)    11/14/24 0832    11/14/24 0317  Inpatient Case Management  Consult  Once        Provider:  (Not yet assigned)    11/14/24 0317 11/13/24 2233  [facility] (on-call MD unless specified) Details  Once        Specialty:  Hospitalist  Provider:  (Not yet assigned)    11/13/24 2232 11/13/24 2102  Inpatient Orthopedic Surgery Consult  STAT,   Status:  Canceled        Specialty:  Orthopedic Surgery  Provider:  Tyler Zamora MD    11/13/24 2101                  Procedures     Imaging Results (All)       Procedure Component Value Units Date/Time    XR Spine Thoracic 3 View [[id number]] Collected: 11/13/24 2232     Updated: 11/13/24 2241    Narrative:      3 VIEWS THORACIC SPINE; 4 VIEWS LUMBAR SPINE     HISTORY: Fall with back pain     COMPARISON: November 13, 2024     FINDINGS:  Thoracic spine: No acute fracture or subluxation of the thoracic spine  is seen. The patient is status post posterior spinal fusion, which  extends from T9-S1. There is a changes of anterior cervical spinal  fusion at C3-C6. There is multilevel  discogenic degenerative disease.     Lumbar spine: Again, there is posterior spinal hardware, which extends  from T9-S1. There are further bilateral sacroiliac screws. There are  interbody grafts which are noted at L2-L3, L3-L4, and L5-S1 no acute  fracture or subluxation is seen. No fracture of the hardware is  identified. The patient is noted to have excreted contrast material  within the urinary bladder.       Impression:      No acute fracture or subluxation identified.     This report was finalized on 11/13/2024 10:38 PM by Dr. Betina Blake M.D on Workstation: [id number]       XR Spine Lumbar Complete 4+VW [[id number]] Collected: 11/13/24 2232     Updated: 11/13/24 2241    Narrative:      3 VIEWS THORACIC SPINE; 4 VIEWS LUMBAR SPINE     HISTORY: Fall with back pain     COMPARISON: November 13, 2024     FINDINGS:  Thoracic spine: No acute fracture or subluxation of the thoracic spine  is seen. The patient is status post posterior spinal fusion, which  extends from T9-S1. There is a changes of anterior cervical spinal  fusion at C3-C6. There is multilevel discogenic degenerative disease.     Lumbar spine: Again, there is posterior spinal hardware, which extends  from T9-S1. There are further bilateral sacroiliac screws. There are  interbody grafts which are noted at L2-L3, L3-L4, and L5-S1 no acute  fracture or subluxation is seen. No fracture of the hardware is  identified. The patient is noted to have excreted contrast material  within the urinary bladder.       Impression:      No acute fracture or subluxation identified.     This report was finalized on 11/13/2024 10:38 PM by Dr. Betina Blake M.D on Workstation: [id number]       CT Angiogram Chest Pulmonary Embolism [[id number]] Collected: 11/13/24 2003     Updated: 11/13/24 2010    Narrative:      CT ANGIOGRAM CHEST PULMONARY EMBOLISM-     Radiation dose reduction techniques were utilized, including automated  exposure control and exposure  modulation based on body size.     Clinical: Elevated D-dimer, syncope     CT scan of the chest performed with intravenous contrast, pulmonary  embolus protocol to include coronal, sagittal and three-dimensional  reconstruction.     FINDINGS: Cardiac size upper limits of normal to mildly enlarged. No  aortic aneurysm nor dissection. No pulmonary embolus demonstrated, there  is some respiratory motion artifact. In addition there is artifact  arising from the thoracolumbar fusion hardware. The esophagus is  satisfactory in appearance. There are small calcified benign right hilar  and mediastinal lymph nodes, no adenopathy. There is a minimal amount of  dependent atelectasis at both lung bases, no pleural effusion,  suspicious pulmonary nodule or lung consolidation is demonstrated.  Limited images through the upper abdomen are unremarkable.     CONCLUSION: No pulmonary embolus demonstrated. There is dependent  atelectasis at the lung bases, the lungs are otherwise clear. The  remainder is unremarkable.              This report was finalized on 11/13/2024 8:07 PM by Dr. Rubén Strickland M.D on Workstation: [id number]       CT Head Without Contrast [[id number]] Collected: 11/13/24 1955     Updated: 11/13/24 2001    Narrative:      CT HEAD WO CONTRAST-     INDICATIONS: Head injury     TECHNIQUE: Radiation dose reduction techniques were utilized, including  automated exposure control and exposure modulation based on body size.  Noncontrast head CT     COMPARISON: None available     FINDINGS:     No acute intracranial hemorrhage, midline shift or mass effect. No acute  territorial infarct is identified.     Ventricles, cisterns, cerebral sulci are unremarkable for patient age.     The visualized paranasal sinuses, orbits, mastoid air cells are  unremarkable.          Impression:         No acute intracranial hemorrhage or hydrocephalus. If there is further  clinical concern, MRI could be considered for further evaluation.    "  This report was finalized on 11/13/2024 7:57 PM by Dr. Delta Eagle M.D on Workstation: [id number]               Pertinent Labs     Results from last 7 days   Lab Units 11/14/24  0441 11/13/24  1859   WBC 10*3/mm3 16.03* 18.17*   HEMOGLOBIN g/dL 8.8* 10.2*   PLATELETS 10*3/mm3 346 465*     Results from last 7 days   Lab Units 11/14/24  0440 11/13/24  1859   SODIUM mmol/L 135* 136   POTASSIUM mmol/L 4.0 4.3   CHLORIDE mmol/L 97* 99   CO2 mmol/L 27.2 26.6   BUN mg/dL 9 12   CREATININE mg/dL 0.65* 0.77   GLUCOSE mg/dL 148* 153*   Estimated Creatinine Clearance: 182.4 mL/min (A) (by C-G formula based on SCr of 0.65 mg/dL (L)).  Results from last 7 days   Lab Units 11/13/24  1859   ALBUMIN g/dL 3.6   BILIRUBIN mg/dL 0.2   ALK PHOS U/L 78   AST (SGOT) U/L 27   ALT (SGPT) U/L 17     Results from last 7 days   Lab Units 11/14/24  0440 11/13/24  1859   CALCIUM mg/dL 8.6 8.9   ALBUMIN g/dL  --  3.6       Results from last 7 days   Lab Units 11/13/24  2106 11/13/24  1859   HSTROP T ng/L 12 11   D DIMER QUANT MCGFEU/mL  --  2.62*           Invalid input(s): \"LDLCALC\"        Test Results Pending at Discharge     Pending Labs       Order Current Status    Blood Culture - Blood, Arm, Left In process    Blood Culture - Blood, Arm, Right In process            Discharge Details        Discharge Medications        New Medications        Instructions Start Date   naloxone 4 MG/0.1ML nasal spray  Commonly known as: NARCAN   Call 911. Don't prime. [place] in 1 nostril for overdose. Repeat in 2-3 minutes in other nostril if no or minimal breathing/responsiveness.      sennosides-docusate 8.6-50 MG per tablet  Commonly known as: PERICOLACE   2 tablets, Oral, Daily             Changes to Medications        Instructions Start Date   baclofen 20 MG tablet  Commonly known as: LIORESAL  What changed: when to take this   20 mg, Oral, 2 Times Daily PRN             Continue These Medications        Instructions Start Date   amitriptyline 50 MG " tablet  Commonly known as: ELAVIL   50 mg, Nightly      atorvastatin 10 MG tablet  Commonly known as: LIPITOR   10 mg, Daily      cholecalciferol 25 MCG (1000 UT) tablet  Commonly known as: VITAMIN D3   1,000 Units, Daily      DULoxetine 30 MG capsule  Commonly known as: CYMBALTA   30 mg, Daily      folic acid 1 MG tablet  Commonly known as: FOLVITE   1 mg, Daily      gabapentin 800 MG tablet  Commonly known as: NEURONTIN   800 mg, 3 Times Daily      lisinopril 10 MG tablet  Commonly known as: PRINIVIL,ZESTRIL   5 mg, Oral, Daily      melatonin 1 MG tablet   3 mg, Nightly PRN      oxyCODONE-acetaminophen 7.5-325 MG per tablet  Commonly known as: PERCOCET   1 tablet, Every 4 Hours PRN               No Known Allergies      Discharge Disposition:  Home or Self Care    Discharge Diet:  Diet Order   Procedures    Diet: Regular/House; Fluid Consistency: Thin (IDDSI 0)       Discharge Activity:   Activity Instructions       Activity as Tolerated              CODE STATUS:    Code Status and Medical Interventions: CPR (Attempt to Resuscitate); Full Support   Ordered at: 11/13/24 4245     Code Status (Patient has no pulse and is not breathing):    CPR (Attempt to Resuscitate)     Medical Interventions (Patient has pulse or is breathing):    Full Support       No future appointments.  Additional Instructions for the Follow-ups that You Need to Schedule       Call MD With Problems / Concerns   As directed      Instructions: return to the hospital if you experience chest pain, shortness of breath, abdominal pain, nausea, vomiting, fevers, sweats, chills, or worsening of your symptoms    Order Comments: Instructions: return to the hospital if you experience chest pain, shortness of breath, abdominal pain, nausea, vomiting, fevers, sweats, chills, or worsening of your symptoms         Discharge Follow-up with PCP   As directed       Currently Documented PCP:    Provider, No Known    PCP Phone Number:    [phone]     Follow Up  Details: 3-4 weeks for a repeat urinalysis        Discharge Follow-up with Specialty: spine surgery at [place] 1 week   As directed      Specialty: spine surgery at [place] 1 week               Follow-up Information       Provider, No Known .    Why: 3-4 weeks for a repeat urinalysis  Contact information:  [facility] [address]  [phone]                             Additional Instructions for the Follow-ups that You Need to Schedule       Call MD With Problems / Concerns   As directed      Instructions: return to the hospital if you experience chest pain, shortness of breath, abdominal pain, nausea, vomiting, fevers, sweats, chills, or worsening of your symptoms    Order Comments: Instructions: return to the hospital if you experience chest pain, shortness of breath, abdominal pain, nausea, vomiting, fevers, sweats, chills, or worsening of your symptoms         Discharge Follow-up with PCP   As directed       Currently Documented PCP:    Provider, No Known    PCP Phone Number:    [phone]     Follow Up Details: 3-4 weeks for a repeat urinalysis        Discharge Follow-up with Specialty: spine surgery at [place] 1 week   As directed      Specialty: spine surgery at [place] 1 week            Time Spent on Discharge:  Greater than 30 minutes      Tiago Mendez MD  [facility]  11/14/24  10:02 EST

## 2024-11-14 NOTE — CASE MANAGEMENT/SOCIAL WORK
Case Management Discharge Note      Final Note: [facility], [facility] friend transproted home.    Provided Post Acute Provider List?: N/A  Provided Post Acute Provider Quality & Resource List?: N/A    Selected Continued Care - Discharged on 11/14/2024 Admission date: 11/13/2024 - Discharge disposition: Home or Self Care      Destination    No services have been selected for the patient.                Durable Medical Equipment    No services have been selected for the patient.                Dialysis/Infusion    No services have been selected for the patient.                Home Medical Care    No services have been selected for the patient.                Therapy    No services have been selected for the patient.                Community Resources    No services have been selected for the patient.                Community & DME    No services have been selected for the patient.                    Transportation Services  Private: Car    Final Discharge Disposition Code: 06 - home with home health care

## 2024-11-14 NOTE — ED PROVIDER NOTES
EMERGENCY DEPARTMENT MD ATTESTATION NOTE    Room Number:  N433/1  PCP: System, Provider Not In  Independent Historians: Patient and EMS    HPI:  A complete HPI/ROS/PMH/PSH/SH/FH are unobtainable due to: Altered Mental Status    Chronic or social conditions impacting patient care (Social Determinants of Health): None      Context: Oracio Spencer is a 54 y.o. male with a medical history of spinal surgery who presents to the ED c/o acute fall with altered mental status.  EMS reports the patient fell forward onto his face.  He suffered a laceration.  The patient was noted to have back surgery 3 days ago.  He is not able to provide much history.        Review of prior external notes (non-ED) -and- Review of prior external test results outside of this encounter:  Discharge summary dated 2 days ago notes spinal fusion.  This was from L5-S1 anteriorly and T9 to the pelvis posteriorly.    Prescription drug monitoring program review:           PHYSICAL EXAM    I have reviewed the triage vital signs and nursing notes.    ED Triage Vitals [11/13/24 1827]   Temp Heart Rate Resp BP SpO2   97.8 °F (36.6 °C) 120 16 140/76 98 %      Temp src Heart Rate Source Patient Position BP Location FiO2 (%)   -- -- -- -- --       Physical Exam  GENERAL: Decreased responsiveness, ill-appearing  SKIN: Warm, dry  HENT: Normocephalic, laceration to the forehead  EYES: no scleral icterus  CV: regular rhythm, regular rate  RESPIRATORY: normal effort, lungs clear  ABDOMEN: soft, nontender, nondistended  MUSCULOSKELETAL: no deformity  NEURO: alert, moves all extremities, follows commands            MEDICATIONS GIVEN IN ER  Medications   lidocaine 1% - EPINEPHrine 1:041599 (XYLOCAINE W/EPI) 1 %-1:928507 injection 10 mL (10 mL Injection Given 11/13/24 2010)   Lido-EPINEPHrine-Tetracaine 4-0.18-0.5 % gel 3 mL (3 mL Topical Given 11/13/24 2008)   iopamidol (ISOVUE-370) 76 % injection 100 mL (95 mL Intravenous Given 11/13/24 1942)   ondansetron (ZOFRAN)  injection 4 mg (4 mg Intravenous Given 11/13/24 2054)   naloxone (NARCAN) injection 1 mg (0.4 mcg/mL Intravenous Given 11/13/24 2054)   sodium chloride 0.9 % bolus 1,000 mL (0 mL Intravenous Stopped 11/14/24 0048)   droperidol (INAPSINE) injection 2.5 mg (2.5 mg Intravenous Given 11/13/24 2222)         ORDERS PLACED DURING THIS VISIT:  Orders Placed This Encounter   Procedures    Laceration Repair    Blood Culture - Blood,    Blood Culture - Blood, Arm, Right    CT Head Without Contrast    CT Angiogram Chest Pulmonary Embolism    XR Spine Thoracic 3 View    XR Spine Lumbar Complete 4+VW    Comprehensive Metabolic Panel    High Sensitivity Troponin T    D-dimer, Quantitative    CBC Auto Differential    Urinalysis With Microscopic If Indicated (No Culture) - Urine, Clean Catch    Urine Drug Screen - Urine, Clean Catch    Ethanol    Manual Differential    Urinalysis, Microscopic Only - Urine, Clean Catch    High Sensitivity Troponin T 2Hr    Lactic Acid, Plasma    Sedimentation Rate    C-reactive Protein    Blood Gas, Arterial -    Salicylate Level    Acetaminophen Level    STAT Lactic Acid, Reflex    CBC (No Diff)    Basic Metabolic Panel    Ferritin    Iron Profile    Place Sequential Compression Device    Maintain IV Access    Telemetry - Place Orders & Notify Provider of Results When Patient Experiences Acute Chest Pain, Dysrhythmia or Respiratory Distress    Discharge Follow-up with Specialty: spine surgery at [facility] 1 week    Discharge Follow-up with PCP    Activity as Tolerated    Call MD With Problems / Concerns    Diet: Regular/House Diet; Regular (IDDSI 7); Thin (IDDSI 0)    Inpatient Case Management  Consult    ECG 12 Lead Syncope    Telemetry Scan    Telemetry Scan    Telemetry Scan    Initiate Observation Status    Discharge patient    CBC & Differential         PROCEDURES  Procedures      Critical care provider statement:    Critical care time (minutes): 45.   Critical care time was  exclusive of:  Separately billable procedures and treating other patients   Critical care was necessary to treat or prevent imminent or life-threatening deterioration of the following conditions:  Respiratory Failure   Critical care was time spent personally by me on the following activities:  Development of treatment plan with patient or surrogate, discussions with consultants, evaluation of patient's response to treatment, examination of patient, obtaining history from patient or surrogate, ordering and performing treatments and interventions, ordering and review of laboratory studies, ordering and review of radiographic studies, pulse oximetry, re-evaluation of patient's condition and review of old charts. Critical Care indicators:        PROGRESS, DATA ANALYSIS, CONSULTS, AND MEDICAL DECISION MAKING  All labs have been independently interpreted by me.  All radiology studies have been reviewed by me. All EKG's have been independently viewed and interpreted by me.  Discussion below represents my analysis of pertinent findings related to patient's condition, differential diagnosis, treatment plan and final disposition.    Differential diagnosis includes but is not limited to intracranial hemorrhage, sepsis, opioid intoxication, substance use.    Clinical Scores:                   ED Course as of 11/15/24 1914   Wed Nov 13, 2024   1851 First look: Patient presents with facial injury after fall prior to arrival.  Patient reportedly smoking marijuana and taking pain pills.  At this point he appears under the influence of drugs or alcohol.  Vitals are stable.  Back surgery 3 days ago.  Plan for basic labs, EKG, head CT. [EE]   2008 WBC(!): 18.17 [CC]   2008 THC Screen, Urine(!): Positive [CC]   2008 Oxycodone Screen, Urine(!): Positive [CC]   2008 Fentanyl, Urine(!): Positive [CC]   2008 CT Head Without Contrast  My independent interpretation of the CT of the head is no intracranial hemorrhage [CC]   2008 D-Dimer,  Quant(!): 2.62  CTA pending [CC]   2024 CT Angiogram Chest Pulmonary Embolism  My independent interpretation of the CTA is no saddle PE [CC]   2024 I discussed the case with Dr. Roth and he agrees to evaluate the patient at the bedside.    [CC]   2044 On reevaluation patient is very obtunded and diaphoretic.  He does respond to his name and sternal rub but is speaking intelligibly ABG was performed at bedside and patient is compensated..  Will give Narcan given his level of depressed mental status. [CC]   2045 The patient has snoring respirations, was unresponsive, was diaphoretic.  ABG was ordered.  Narcan was administered IV.  The patient is now awake and talking.  His diaphoresis is resolving. [TR]   2046 CT Head Without Contrast  My independent interpretation of the imaging study is no acute hemorrhage [TR]   2050 Patient woke up immediately after Narcan and is now awake, alert, oriented and answering questions.  He says that he took his prescribed pain medication today which she says is two pills every 4 hours but cannot tell me exactly what he is taking.  He denies using any fentanyl despite urine tox.  Patient says he remembers standing trying to put his shirt on and then remembers falling and hitting his head on the floor.  He is unsure if he lost consciousness.  He denies any increased back pain, fevers, chills, nausea, vomiting.  He denies headaches or dizziness.  He says he was recovering well prior to this episode today.  Will continue to monitor. [CC]   2101 C-Reactive Protein(!): 9.30 [CC]   2130 I spoke with spine fellow with [facility] who is familiar with this patient.  He said he is not concerned about the leukocytosis given the patient's recent surgery.  He will note the patient's toxic ingestion and narcotics and would like to see him in follow-up next week in the office.  He does recommend x-rays of the thoracic and lumbar spine to ensure the hardware is in proper alignment.  He  says that the patient has no systemic symptoms concerning for infection and he would not be concerned about a developing infection at this time. [CC]   2139 Lactate(!!): 3.0 [CC]   2144 Sed Rate(!): 43 [CC]   2334 Spoke with WILEY Fisher with [facility].  Reviewed history, exam, results, treatments.  She agrees admit the patient to  .   pending at the time of admission    [CC]      ED Course User Index  [CC] Micki Wilks PA-C  [EE] Star Shore PA  [TR] Franklin Roth MD       MDM: Plan to image the patient's head to rule out intracranial hemorrhage.  We will obtain laboratory evaluation.  There is some mention the patient has been taking pain medicine and THC.  We will check toxicology.  He will likely require admission.  Given his recent surgery at [facility] he will likely require transfer back to that facility.      COMPLEXITY OF CARE  The patient requires admission.    Please note that portions of this document were completed with a voice recognition program.    Note Disclaimer: At [facility], we believe that sharing information builds trust and better relationships. You are receiving this note because you recently visited [facility]. It is possible you will see health information before a provider has talked with you about it. This kind of information can be easy to misunderstand. To help you fully understand what it means for your health, we urge you to discuss this note with your provider.         Franklin Roth MD  11/13/24 2049       Franklin Roth MD  11/13/24 2200       Franklin Roth MD  11/15/24 1914

## 2024-11-14 NOTE — CASE MANAGEMENT/SOCIAL WORK
Discharge Planning Assessment  [facility]     Patient Name: Oracio Spencer  MRN: [id number]  Today's Date: 11/14/2024    Admit Date: 11/13/2024    Plan: Home with home health, will need WC transport   Discharge Needs Assessment       Row Name 11/14/24 1033       Living Environment    People in Home parent(s)    Name(s) of People in Home Pt stated that he lives with his mother    Current Living Arrangements home    Potentially Unsafe Housing Conditions none    In the past 12 months has the electric, gas, oil, or water company threatened to shut off services in your home? No    Primary Care Provided by self    Provides Primary Care For no one    Family Caregiver if Needed none    Quality of Family Relationships unable to assess    Able to Return to Prior Arrangements yes       Resource/Environmental Concerns    Resource/Environmental Concerns none    Transportation Concerns none       Transportation Needs    In the past 12 months, has lack of transportation kept you from medical appointments or from getting medications? no    In the past 12 months, has lack of transportation kept you from meetings, work, or from getting things needed for daily living? No       Food Insecurity    Within the past 12 months, you worried that your food would run out before you got the money to buy more. Never true    Within the past 12 months, the food you bought just didn't last and you didn't have money to get more. Never true       Transition Planning    Patient/Family Anticipates Transition to home;home with family;home with help/services  [facility] and [facility] cannot remember which [facility] it it    Patient/Family Anticipated Services at Transition none    Transportation Anticipated health plan transportation       Discharge Needs Assessment    Equipment Currently Used at Home cane, straight;walker, rolling    Concerns to be Addressed no discharge needs identified;denies needs/concerns at this time     "Do you want help finding or keeping work or a job? I do not need or want help    Do you want help with school or training? For example, starting or completing job training or getting a high school diploma, GED or equivalent No    Anticipated Changes Related to Illness none    Equipment Needed After Discharge none                   Discharge Plan       Row Name 11/14/24 1036       Plan    Plan Home with home health, will need WC transport    Patient/Family in Agreement with Plan yes    Provided Post Acute Provider List? N/A    Provided Post Acute Provider Quality & Resource List? N/A    Plan Comments Met with pt at bedside. Introduced self and explained role of . Face sheet verified, [facility]. Pt denies any difficulty paying for medications, and he stated that [facility]. Pt lives at home with his mother, stated that she could not assist with any care needs, and that he was \"on his own\". Pt stated that he is independent in ADL's and that he uses a walker/cane for mobility. He is [facility], but he cannot recall the name. He has never been to SNF/Rehab and he denies the need for those services. Pt requested WC van for transport home. Explained that CCP would follow to assess for discharge needs.                  Continued Care and Services - Admitted Since 11/13/2024    No active coordination exists for this encounter.       Expected Discharge Date and Time       Expected Discharge Date Expected Discharge Time    Nov 14, 2024            Demographic Summary    No documentation.                  Functional Status    No documentation.                  Psychosocial    No documentation.                  Abuse/Neglect    No documentation.                  Legal    No documentation.                  Substance Abuse    No documentation.                  Patient Forms    No documentation.                     Zhanna Wharton RN    "

## 2024-11-14 NOTE — PLAN OF CARE
Goal Outcome Evaluation:  Plan of Care Reviewed With: grandparent(s)        Progress: no change  Outcome Evaluation: Pt admitted from ER post syncopal episode at home. Sutures intact on bridge of nose. Pt had recent two stage spinal fusion surgery at [place] on 11/8. Island dressing to midline posterior back loose with small amount of dried serous sangious drainage. No drainage noted to visible back wound. Pt uncooperative upon admit to floor. Awake and demanding PO fluids and food. Pt refusing admission question and assessment until he can have fluids. ОЛЕГ HERBERT Notified and diet ordered since pt now awake. Tolerated diet well.  Bedrest maintained. No resp distress noted on R/A. Pt more cooperative with care and database completed later in shift. . Safety maintained.

## 2024-11-14 NOTE — CASE MANAGEMENT/SOCIAL WORK
"Continued Stay Note  [facility]     Patient Name: Oracio Spencer  MRN: [id number]  Today's Date: 11/14/2024    Admit Date: 11/13/2024    Plan: Home with home health, will need WC transport   Discharge Plan       Row Name 11/14/24 1053       Plan    Plan Comments [facility] WC van to transport at 1230pm. Confirmation # [id number].      Row Name 11/14/24 1036       Plan    Plan Home with home health, will need WC transport    Patient/Family in Agreement with Plan yes    Provided Post Acute Provider List? N/A    Provided Post Acute Provider Quality & Resource List? N/A    Plan Comments Met with pt at bedside. Introduced self and explained role of . Face sheet verified, [facility]. Pt denies any difficulty paying for medications, and he stated that [facility]. Pt lives at home with his mother, stated that she could not assist with any care needs, and that he was \"on his own\". Pt stated that he is independent in ADL's and that he uses a walker/cane for mobility. He is [facility], but he cannot recall the name. He has never been to SNF/Rehab and he denies the need for those services. Pt requested WC van for transport home. Explained that CCP would follow to assess for discharge needs.                   Discharge Codes    No documentation.                 Expected Discharge Date and Time       Expected Discharge Date Expected Discharge Time    Nov 14, 2024               Zhanna Wharton RN    "

## 2024-11-14 NOTE — ED NOTES
"Nursing report ED to floor  Oracio Spencer  54 y.o.  male    HPI :  HPI  Stated Reason for Visit: Patient had a fall at home, patient uses a walker and has an unsteady gait at baseline. Patient reports he has taken his prescribed oxycontin and use marijuana. Patient fell forward onto face, laceration between eyebrows. No loc, or anticoagulants. Patient has chronic back pain, had back surgery 3 days ago.    Chief Complaint  Chief Complaint   Patient presents with    Fall    Facial Laceration       Admitting doctor:   Bereket Fontaine MD    Admitting diagnosis:   The primary encounter diagnosis was Opiate overdose, accidental or unintentional, initial encounter. Diagnoses of Syncope, unspecified syncope type, Laceration of forehead, initial encounter, and Acute midline low back pain without sciatica were also pertinent to this visit.    Code status:   Current Code Status       Date Active Code Status Order ID Comments User Context       11/13/2024 2333 CPR (Attempt to Resuscitate) [id number]  Loreto Medrano APRN ED        Question Answer    Code Status (Patient has no pulse and is not breathing) CPR (Attempt to Resuscitate)    Medical Interventions (Patient has pulse or is breathing) Full Support                    Allergies:   Patient has no known allergies.    Isolation:   No active isolations    Intake and Output  No intake or output data in the 24 hours ending 11/13/24 2353    Weight:       11/13/24 2243   Weight: 122 kg (269 lb 1.6 oz)       Most recent vitals:   Vitals:    11/13/24 2132 11/13/24 2222 11/13/24 2243 11/13/24 2331   BP:  142/85  145/81   Pulse: 105 108  103   Resp:       Temp:       SpO2: 99% 93%  95%   Weight:   122 kg (269 lb 1.6 oz)    Height:   188 cm (74\")        Active LDAs/IV Access:   Lines, Drains & Airways       Active LDAs       Name Placement date Placement time Site Days    Peripheral IV 11/13/24 1900 Right Antecubital 11/13/24 1900  Antecubital  less than 1                    Labs " "(abnormal labs have a star):   Labs Reviewed   COMPREHENSIVE METABOLIC PANEL - Abnormal; Notable for the following components:       Result Value    Glucose 153 (*)     All other components within normal limits    Narrative:     GFR Normal >60  Chronic Kidney Disease <60  Kidney Failure <15     D-DIMER, QUANTITATIVE - Abnormal; Notable for the following components:    D-Dimer, Quantitative 2.62 (*)     All other components within normal limits    Narrative:     According to the assay 's published package insert, a normal (<0.50 MCGFEU/mL) D-dimer result in conjunction with a non-high clinical probability assessment, excludes deep vein thrombosis (DVT) and pulmonary embolism (PE) with high sensitivity.    D-dimer values increase with age and this can make VTE exclusion of an older population difficult. To address this, the [employer], based on best available evidence and recent guidelines, recommends that clinicians use age-adjusted D-dimer thresholds in patients greater than 50 years of age with: a) a low probability of PE who do not meet all Pulmonary Embolism Rule Out Criteria, or b) in those with intermediate probability of PE.   The formula for an age-adjusted D-dimer cut-off is \"age/100\".  For example, a 60 year old patient would have an age-adjusted cut-off of 0.60 MCGFEU/mL and an 80 year old 0.80 MCGFEU/mL.   CBC WITH AUTO DIFFERENTIAL - Abnormal; Notable for the following components:    WBC 18.17 (*)     RBC 3.30 (*)     Hemoglobin 10.2 (*)     Hematocrit 31.0 (*)     Platelets 465 (*)     All other components within normal limits   URINALYSIS W/ MICROSCOPIC IF INDICATED (NO CULTURE) - Abnormal; Notable for the following components:    Specific Gravity, UA >1.030 (*)     Blood, UA Small (1+) (*)     Protein, UA Trace (*)     All other components within normal limits   URINE DRUG SCREEN - Abnormal; Notable for the following components:    THC, Screen, Urine Positive (*)     " Oxycodone Screen, Urine Positive (*)     Fentanyl, Urine Positive (*)     All other components within normal limits    Narrative:     Negative Thresholds Per Drugs Screened:    Amphetamines                 500 ng/ml  Barbiturates                 200 ng/ml  Benzodiazepines              100 ng/ml  Cocaine                      300 ng/ml  Methadone                    300 ng/ml  Opiates                      300 ng/ml  Oxycodone                    100 ng/ml  THC                           50 ng/ml  Fentanyl                       5 ng/ml      The Normal Value for all drugs tested is negative. This report includes final unconfirmed screening results to be used for medical treatment purposes only. Unconfirmed results must not be used for non-medical purposes such as employment or legal testing. Clinical consideration should be applied to any drug of abuse test, particularly when unconfirmed results are used.           MANUAL DIFFERENTIAL - Abnormal; Notable for the following components:    Neutrophil % 87.8 (*)     Lymphocyte % 4.1 (*)     Neutrophils Absolute 15.95 (*)     Monocytes Absolute 0.93 (*)     Eosinophils Absolute 0.56 (*)     All other components within normal limits   URINALYSIS, MICROSCOPIC ONLY - Abnormal; Notable for the following components:    RBC, UA 21-50 (*)     All other components within normal limits   LACTIC ACID, PLASMA - Abnormal; Notable for the following components:    Lactate 3.0 (*)     All other components within normal limits   SEDIMENTATION RATE - Abnormal; Notable for the following components:    Sed Rate 43 (*)     All other components within normal limits   C-REACTIVE PROTEIN - Abnormal; Notable for the following components:    C-Reactive Protein 9.30 (*)     All other components within normal limits   BLOOD GAS, ARTERIAL - Abnormal; Notable for the following components:    pCO2, Arterial 49.2 (*)     pO2, Arterial 101.3 (*)     HCO3, Arterial 30.4 (*)     Base Excess, Arterial 4.8 (*)      CO2 Content 31.9 (*)     All other components within normal limits   TROPONIN - Normal    Narrative:     High Sensitive Troponin T Reference Range:  <14.0 ng/L- Negative Female for AMI  <22.0 ng/L- Negative Male for AMI  >=14 - Abnormal Female indicating possible myocardial injury.  >=22 - Abnormal Male indicating possible myocardial injury.   Clinicians would have to utilize clinical acumen, EKG, Troponin, and serial changes to determine if it is an Acute Myocardial Infarction or myocardial injury due to an underlying chronic condition.        HIGH SENSITIVITIY TROPONIN T 2HR - Normal    Narrative:     High Sensitive Troponin T Reference Range:  <14.0 ng/L- Negative Female for AMI  <22.0 ng/L- Negative Male for AMI  >=14 - Abnormal Female indicating possible myocardial injury.  >=22 - Abnormal Male indicating possible myocardial injury.   Clinicians would have to utilize clinical acumen, EKG, Troponin, and serial changes to determine if it is an Acute Myocardial Infarction or myocardial injury due to an underlying chronic condition.        SALICYLATE LEVEL - Normal    Narrative:     Therapeutic range for Salicylates:  3.0 - 10.0 mg/dL for antipyretic/analgesic conditions  15.0 - 30.0 mg/dL for anti-inflammatory conditions   ACETAMINOPHEN LEVEL - Normal   BLOOD CULTURE   ETHANOL   LACTIC ACID, REFLEX   CBC AND DIFFERENTIAL    Narrative:     The following orders were created for panel order CBC & Differential.  Procedure                               Abnormality         Status                     ---------                               -----------         ------                     CBC Auto Differential[[id number]]        Abnormal            Final result                 Please view results for these tests on the individual orders.       EKG:   ECG 12 Lead Syncope   Final Result   HEART DUFL=818  bpm   RR Utoxdkle=083  ms   WY Ljbsqmox=436  ms   P Horizontal Axis=4  deg   P Front Axis=57  deg   QRSD Interval=84  ms    QT Qjotvzhu=792  ms   EVxN=396  ms   QRS Axis=26  deg   T Wave Axis=-10  deg   - BORDERLINE ECG -   Sinus tachycardia   Borderline  T abnormalities, inferior leads   Borderline  prolonged QT interval   No Prior Tracing for Comparison   Electronically Signed By: Dajuan Manning ([facility]) 2024-11-13 21:25:53   Date and Time of Study:2024-11-13 19:00:01          Meds given in ED:   Medications   sodium chloride 0.9 % flush 10 mL (has no administration in time range)   sodium chloride 0.9 % flush 10 mL (has no administration in time range)   nitroglycerin (NITROSTAT) SL tablet 0.4 mg (has no administration in time range)   acetaminophen (TYLENOL) tablet 650 mg (has no administration in time range)     Or   acetaminophen (TYLENOL) 160 MG/5ML oral solution 650 mg (has no administration in time range)     Or   acetaminophen (TYLENOL) suppository 650 mg (has no administration in time range)   sennosides-docusate (PERICOLACE) 8.6-50 MG per tablet 2 tablet (has no administration in time range)     And   polyethylene glycol (MIRALAX) packet 17 g (has no administration in time range)     And   bisacodyl (DULCOLAX) EC tablet 5 mg (has no administration in time range)     And   bisacodyl (DULCOLAX) suppository 10 mg (has no administration in time range)   ondansetron ODT (ZOFRAN-ODT) disintegrating tablet 4 mg (has no administration in time range)     Or   ondansetron (ZOFRAN) injection 4 mg (has no administration in time range)   aluminum-magnesium hydroxide-simethicone (MAALOX MAX) 400-400-40 MG/5ML suspension 15 mL (has no administration in time range)   lidocaine 1% - EPINEPHrine 1:935105 (XYLOCAINE W/EPI) 1 %-1:190203 injection 10 mL (10 mL Injection Given 11/13/24 2010)   Lido-EPINEPHrine-Tetracaine 4-0.18-0.5 % gel 3 mL (3 mL Topical Given 11/13/24 2008)   iopamidol (ISOVUE-370) 76 % injection 100 mL (95 mL Intravenous Given 11/13/24 1942)   ondansetron (ZOFRAN) injection 4 mg (4 mg Intravenous Given 11/13/24 0493)    naloxone (NARCAN) injection 1 mg (0.4 mcg/mL Intravenous Given 11/13/24 2054)   sodium chloride 0.9 % bolus 1,000 mL (1,000 mL Intravenous New Bag 11/13/24 2223)   droperidol (INAPSINE) injection 2.5 mg (2.5 mg Intravenous Given 11/13/24 2222)       Imaging results:  XR Spine Thoracic 3 View    Result Date: 11/13/2024  No acute fracture or subluxation identified.  This report was finalized on 11/13/2024 10:38 PM by Dr. Betina Blake M.D on Workstation: [id number]      XR Spine Lumbar Complete 4+VW    Result Date: 11/13/2024  No acute fracture or subluxation identified.  This report was finalized on 11/13/2024 10:38 PM by Dr. Betina Blake M.D on Workstation: [id number]      CT Head Without Contrast    Result Date: 11/13/2024   No acute intracranial hemorrhage or hydrocephalus. If there is further clinical concern, MRI could be considered for further evaluation.  This report was finalized on 11/13/2024 7:57 PM by Dr. Delta Eagle M.D on Workstation: [id number]       Ambulatory status:   - bedrest    Social issues:   Social History     Socioeconomic History    Marital status: Unknown       Peripheral Neurovascular  Peripheral Neurovascular (Adult)  Peripheral Neurovascular WDL: WDL    Neuro Cognitive  Neuro Cognitive (Adult)  Cognitive/Neuro/Behavioral WDL: WDL    Learning  Learning Assessment  Learning Readiness and Ability: no barriers identified    Respiratory  Respiratory WDL  Respiratory WDL: WDL    Abdominal Pain       Pain Assessments  Pain (Adult)  (0-10) Pain Rating: Rest: 8    NIH Stroke Scale       Yari Junior RN  11/13/24 23:53 EST

## 2024-11-14 NOTE — ED PROVIDER NOTES
Laceration Repair    Date/Time: 11/13/2024 8:22 PM    Performed by: Star Shore PA  Authorized by: Star Shore PA    Universal protocol:     Patient identity confirmed:  Verbally with patient  Anesthesia:     Anesthesia method:  Topical application and local infiltration    Topical anesthetic:  LET    Local anesthetic:  Lidocaine 1% WITH epi  Laceration details:     Location:  Face    Face location:  Forehead    Length (cm):  2.9  Pre-procedure details:     Preparation:  Patient was prepped and draped in usual sterile fashion and imaging obtained to evaluate for foreign bodies  Exploration:     Hemostasis achieved with:  Epinephrine    Wound extent: no foreign bodies/material noted    Treatment:     Area cleansed with:  Chlorhexidine    Amount of cleaning:  Standard    Irrigation solution:  Sterile saline  Skin repair:     Repair method:  Sutures    Suture size:  5-0    Suture material:  Nylon    Suture technique:  Simple interrupted, running and horizontal mattress    Number of sutures:  6  Approximation:     Approximation:  Close  Repair type:     Repair type:  Simple  Post-procedure details:     Dressing:  Antibiotic ointment    Procedure completion:  Tolerated well, no immediate complications         Star Shore PA  11/13/24 2024

## 2024-11-14 NOTE — ED PROVIDER NOTES
EMERGENCY DEPARTMENT ENCOUNTER  Room Number:  04/04  PCP: Provider, No Known  Independent Historians: Patient      HPI:  Chief Complaint: had concerns including Fall and Facial Laceration.     A complete HPI/ROS/PMH/PSH/SH/FH are unobtainable due to: Altered Mental Status    Chronic or social conditions impacting patient care (Social Determinants of Health): None      Context: Oracio Spencer is a 54 y.o. male with a medical history of chronic back pain with recent back surgery presents emergency department today after a syncopal episode resulting in a facial injury.  Patient had a lumbar fusion on 11/7 and 11/8/2024 with Dr. Zamora at [facility] and today reports repeatedly smoking marijuana and taking pain pills.  Events of the actual syncopal episode are unclear as the patient appears acutely intoxicated and is unable to give much of a history.  Patient reports no particular complaints at this time.      Review of prior external notes (non-ED) -and- Review of prior external test results outside of this encounter:   Patient had a posterior fusion from T9 to the pelvis on 11/7/2024 with Dr. Zamora and had an anterior fusion at L5-S1 on 11/8/2024    I reviewed labs from 11/11/2024, hemoglobin 8.6    PAST MEDICAL HISTORY  Active Ambulatory Problems     Diagnosis Date Noted    No Active Ambulatory Problems     Resolved Ambulatory Problems     Diagnosis Date Noted    No Resolved Ambulatory Problems     No Additional Past Medical History         PAST SURGICAL HISTORY  No past surgical history on file.      FAMILY HISTORY  No family history on file.      SOCIAL HISTORY  Social History     Socioeconomic History    Marital status: Unknown         ALLERGIES  Patient has no known allergies.      REVIEW OF SYSTEMS  Included in HPI  All systems reviewed and negative except for those discussed in HPI.      PHYSICAL EXAM    I have reviewed the triage vital signs and nursing notes.    ED Triage Vitals [11/13/24 1827]   Temp  Heart Rate Resp BP SpO2   97.8 °F (36.6 °C) 120 16 140/76 98 %      Temp src Heart Rate Source Patient Position BP Location FiO2 (%)   -- -- -- -- --       Physical Exam  Constitutional:       Comments: Obtunded, will only arouse to painful stimuli but is still babbling incoherently.  Staring blankly at the ceiling   HENT:      Head: Normocephalic and atraumatic.      Mouth/Throat:      Mouth: Mucous membranes are moist.   Eyes:      Extraocular Movements: Extraocular movements intact.      Pupils: Pupils are equal, round, and reactive to light.   Cardiovascular:      Rate and Rhythm: Normal rate and regular rhythm.      Pulses: Normal pulses.      Heart sounds: Normal heart sounds.   Pulmonary:      Effort: Pulmonary effort is normal. No respiratory distress.      Breath sounds: Normal breath sounds. No wheezing, rhonchi or rales.   Abdominal:      General: Abdomen is flat. There is no distension.      Palpations: Abdomen is soft.      Tenderness: There is no abdominal tenderness. There is no guarding or rebound.   Musculoskeletal:         General: Normal range of motion.      Cervical back: Normal range of motion and neck supple.   Skin:     General: Skin is warm and dry.      Capillary Refill: Capillary refill takes less than 2 seconds.      Comments: Surgical incision from the mid thoracic spine down to the pelvis is clean dry intact.  No wound dehiscence, purulent drainage, foul odor, or signs of infection   Neurological:      General: No focal deficit present.      Mental Status: He is oriented to person, place, and time.      Comments: Will spontaneously move all extremities and follows some simple commands but requires repetitive stimulation.   Psychiatric:         Mood and Affect: Mood normal.         Behavior: Behavior normal.           LAB RESULTS  Recent Results (from the past 24 hours)   Comprehensive Metabolic Panel    Collection Time: 11/13/24  6:59 PM    Specimen: Blood   Result Value Ref Range     Glucose 153 (H) 65 - 99 mg/dL    BUN 12 6 - 20 mg/dL    Creatinine 0.77 0.76 - 1.27 mg/dL    Sodium 136 136 - 145 mmol/L    Potassium 4.3 3.5 - 5.2 mmol/L    Chloride 99 98 - 107 mmol/L    CO2 26.6 22.0 - 29.0 mmol/L    Calcium 8.9 8.6 - 10.5 mg/dL    Total Protein 7.2 6.0 - 8.5 g/dL    Albumin 3.6 3.5 - 5.2 g/dL    ALT (SGPT) 17 1 - 41 U/L    AST (SGOT) 27 1 - 40 U/L    Alkaline Phosphatase 78 39 - 117 U/L    Total Bilirubin 0.2 0.0 - 1.2 mg/dL    Globulin 3.6 gm/dL    A/G Ratio 1.0 g/dL    BUN/Creatinine Ratio 15.6 7.0 - 25.0    Anion Gap 10.4 5.0 - 15.0 mmol/L    eGFR 107.1 >60.0 mL/min/1.73   High Sensitivity Troponin T    Collection Time: 11/13/24  6:59 PM    Specimen: Blood   Result Value Ref Range    HS Troponin T 11 <22 ng/L   D-dimer, Quantitative    Collection Time: 11/13/24  6:59 PM    Specimen: Blood   Result Value Ref Range    D-Dimer, Quantitative 2.62 (H) 0.00 - 0.53 MCGFEU/mL   CBC Auto Differential    Collection Time: 11/13/24  6:59 PM    Specimen: Blood   Result Value Ref Range    WBC 18.17 (H) 3.40 - 10.80 10*3/mm3    RBC 3.30 (L) 4.14 - 5.80 10*6/mm3    Hemoglobin 10.2 (L) 13.0 - 17.7 g/dL    Hematocrit 31.0 (L) 37.5 - 51.0 %    MCV 93.9 79.0 - 97.0 fL    MCH 30.9 26.6 - 33.0 pg    MCHC 32.9 31.5 - 35.7 g/dL    RDW 13.2 12.3 - 15.4 %    RDW-SD 45.3 37.0 - 54.0 fl    MPV 8.7 6.0 - 12.0 fL    Platelets 465 (H) 140 - 450 10*3/mm3   Ethanol    Collection Time: 11/13/24  6:59 PM    Specimen: Blood   Result Value Ref Range    Ethanol <10 0 - 10 mg/dL    Ethanol % <0.010 %   Manual Differential    Collection Time: 11/13/24  6:59 PM    Specimen: Blood   Result Value Ref Range    Neutrophil % 87.8 (H) 42.7 - 76.0 %    Lymphocyte % 4.1 (L) 19.6 - 45.3 %    Monocyte % 5.1 5.0 - 12.0 %    Eosinophil % 3.1 0.3 - 6.2 %    Basophil % 0.0 0.0 - 1.5 %    Neutrophils Absolute 15.95 (H) 1.70 - 7.00 10*3/mm3    Lymphocytes Absolute 0.74 0.70 - 3.10 10*3/mm3    Monocytes Absolute 0.93 (H) 0.10 - 0.90 10*3/mm3     Eosinophils Absolute 0.56 (H) 0.00 - 0.40 10*3/mm3    Basophils Absolute 0.00 0.00 - 0.20 10*3/mm3    Polychromasia Mod/2+ None Seen    WBC Morphology Normal Normal    Platelet Morphology Normal Normal   Sedimentation Rate    Collection Time: 11/13/24  6:59 PM    Specimen: Blood   Result Value Ref Range    Sed Rate 43 (H) 0 - 20 mm/hr   C-reactive Protein    Collection Time: 11/13/24  6:59 PM    Specimen: Blood   Result Value Ref Range    C-Reactive Protein 9.30 (H) 0.00 - 0.50 mg/dL   Salicylate Level    Collection Time: 11/13/24  6:59 PM    Specimen: Blood   Result Value Ref Range    Salicylate <0.3 <=30.0 mg/dL   Acetaminophen Level    Collection Time: 11/13/24  6:59 PM    Specimen: Blood   Result Value Ref Range    Acetaminophen <5.0 0.0 - 30.0 mcg/mL   ECG 12 Lead Syncope    Collection Time: 11/13/24  7:00 PM   Result Value Ref Range    QT Interval 347 ms    QTC Interval 483 ms   Urinalysis With Microscopic If Indicated (No Culture) - Urine, Clean Catch    Collection Time: 11/13/24  7:09 PM    Specimen: Urine, Clean Catch   Result Value Ref Range    Color, UA Yellow Yellow, Straw    Appearance, UA Clear Clear    pH, UA <=5.0 5.0 - 8.0    Specific Gravity, UA >1.030 (H) 1.005 - 1.030    Glucose, UA Negative Negative    Ketones, UA Negative Negative    Bilirubin, UA Negative Negative    Blood, UA Small (1+) (A) Negative    Protein, UA Trace (A) Negative    Leuk Esterase, UA Negative Negative    Nitrite, UA Negative Negative    Urobilinogen, UA 1.0 E.U./dL 0.2 - 1.0 E.U./dL   Urine Drug Screen - Urine, Clean Catch    Collection Time: 11/13/24  7:09 PM    Specimen: Urine, Clean Catch   Result Value Ref Range    Amphet/Methamphet, Screen Negative Negative    Barbiturates Screen, Urine Negative Negative    Benzodiazepine Screen, Urine Negative Negative    Cocaine Screen, Urine Negative Negative    Opiate Screen Negative Negative    THC, Screen, Urine Positive (A) Negative    Methadone Screen, Urine Negative Negative     Oxycodone Screen, Urine Positive (A) Negative    Fentanyl, Urine Positive (A) Negative   Urinalysis, Microscopic Only - Urine, Clean Catch    Collection Time: 11/13/24  7:09 PM    Specimen: Urine, Clean Catch   Result Value Ref Range    RBC, UA 21-50 (A) None Seen, 0-2 /HPF    WBC, UA 0-2 None Seen, 0-2 /HPF    Bacteria, UA None Seen None Seen /HPF    Squamous Epithelial Cells, UA 0-2 None Seen, 0-2 /HPF    Hyaline Casts, UA 0-2 None Seen /LPF    Methodology Automated Microscopy    Blood Gas, Arterial -    Collection Time: 11/13/24  8:43 PM    Specimen: Arterial Blood   Result Value Ref Range    Site Left Radial     Aníbal's Test Positive     pH, Arterial 7.399 7.350 - 7.450 pH units    pCO2, Arterial 49.2 (H) 35.0 - 45.0 mm Hg    pO2, Arterial 101.3 (H) 80.0 - 100.0 mm Hg    HCO3, Arterial 30.4 (H) 22.0 - 28.0 mmol/L    Base Excess, Arterial 4.8 (H) 0.0 - 2.0 mmol/L    O2 Saturation, Arterial 97.7 92.0 - 98.5 %    CO2 Content 31.9 (H) 23 - 27 mmol/L    Barometric Pressure for Blood Gas 749.6000 mmHg    Modality Cannula     Flow Rate 2.0000 lpm    Rate 20 Breaths/minute    Hemodilution No     Device Comment sat 100    High Sensitivity Troponin T 2Hr    Collection Time: 11/13/24  9:06 PM    Specimen: Arm, Left; Blood   Result Value Ref Range    HS Troponin T 12 <22 ng/L    Troponin T Delta 1 >=-4 - <+4 ng/L   Lactic Acid, Plasma    Collection Time: 11/13/24  9:06 PM    Specimen: Arm, Left; Blood   Result Value Ref Range    Lactate 3.0 (C) 0.5 - 2.0 mmol/L         RADIOLOGY  XR Spine Thoracic 3 View, XR Spine Lumbar Complete 4+VW    Result Date: 11/13/2024  3 VIEWS THORACIC SPINE; 4 VIEWS LUMBAR SPINE  HISTORY: Fall with back pain  COMPARISON: November 13, 2024  FINDINGS: Thoracic spine: No acute fracture or subluxation of the thoracic spine is seen. The patient is status post posterior spinal fusion, which extends from T9-S1. There is a changes of anterior cervical spinal fusion at C3-C6. There is multilevel  discogenic degenerative disease.  Lumbar spine: Again, there is posterior spinal hardware, which extends from T9-S1. There are further bilateral sacroiliac screws. There are interbody grafts which are noted at L2-L3, L3-L4, and L5-S1 no acute fracture or subluxation is seen. No fracture of the hardware is identified. The patient is noted to have excreted contrast material within the urinary bladder.      No acute fracture or subluxation identified.  This report was finalized on 11/13/2024 10:38 PM by Dr. Betina Blake M.D on Workstation: [id number]      CT Angiogram Chest Pulmonary Embolism    Result Date: 11/13/2024  CT ANGIOGRAM CHEST PULMONARY EMBOLISM-  Radiation dose reduction techniques were utilized, including automated exposure control and exposure modulation based on body size.  Clinical: Elevated D-dimer, syncope  CT scan of the chest performed with intravenous contrast, pulmonary embolus protocol to include coronal, sagittal and three-dimensional reconstruction.  FINDINGS: Cardiac size upper limits of normal to mildly enlarged. No aortic aneurysm nor dissection. No pulmonary embolus demonstrated, there is some respiratory motion artifact. In addition there is artifact arising from the thoracolumbar fusion hardware. The esophagus is satisfactory in appearance. There are small calcified benign right hilar and mediastinal lymph nodes, no adenopathy. There is a minimal amount of dependent atelectasis at both lung bases, no pleural effusion, suspicious pulmonary nodule or lung consolidation is demonstrated. Limited images through the upper abdomen are unremarkable.  CONCLUSION: No pulmonary embolus demonstrated. There is dependent atelectasis at the lung bases, the lungs are otherwise clear. The remainder is unremarkable.     This report was finalized on 11/13/2024 8:07 PM by Dr. Rubén Strickland M.D on Workstation: [id number]      CT Head Without Contrast    Result Date: 11/13/2024  CT HEAD WO CONTRAST-   INDICATIONS: Head injury  TECHNIQUE: Radiation dose reduction techniques were utilized, including automated exposure control and exposure modulation based on body size. Noncontrast head CT  COMPARISON: None available  FINDINGS:  No acute intracranial hemorrhage, midline shift or mass effect. No acute territorial infarct is identified.  Ventricles, cisterns, cerebral sulci are unremarkable for patient age.  The visualized paranasal sinuses, orbits, mastoid air cells are unremarkable.        No acute intracranial hemorrhage or hydrocephalus. If there is further clinical concern, MRI could be considered for further evaluation.  This report was finalized on 11/13/2024 7:57 PM by Dr. Delta Eagle M.D on Workstation: [id number]         MEDICATIONS GIVEN IN ER  Medications   sodium chloride 0.9 % flush 10 mL (has no administration in time range)   sodium chloride 0.9 % flush 10 mL (has no administration in time range)   nitroglycerin (NITROSTAT) SL tablet 0.4 mg (has no administration in time range)   acetaminophen (TYLENOL) tablet 650 mg (has no administration in time range)     Or   acetaminophen (TYLENOL) 160 MG/5ML oral solution 650 mg (has no administration in time range)     Or   acetaminophen (TYLENOL) suppository 650 mg (has no administration in time range)   sennosides-docusate (PERICOLACE) 8.6-50 MG per tablet 2 tablet (has no administration in time range)     And   polyethylene glycol (MIRALAX) packet 17 g (has no administration in time range)     And   bisacodyl (DULCOLAX) EC tablet 5 mg (has no administration in time range)     And   bisacodyl (DULCOLAX) suppository 10 mg (has no administration in time range)   ondansetron ODT (ZOFRAN-ODT) disintegrating tablet 4 mg (has no administration in time range)     Or   ondansetron (ZOFRAN) injection 4 mg (has no administration in time range)   aluminum-magnesium hydroxide-simethicone (MAALOX MAX) 400-400-40 MG/5ML suspension 15 mL (has no administration in time  range)   lidocaine 1% - EPINEPHrine 1:031344 (XYLOCAINE W/EPI) 1 %-1:960720 injection 10 mL (10 mL Injection Given 11/13/24 2010)   Lido-EPINEPHrine-Tetracaine 4-0.18-0.5 % gel 3 mL (3 mL Topical Given 11/13/24 2008)   iopamidol (ISOVUE-370) 76 % injection 100 mL (95 mL Intravenous Given 11/13/24 1942)   ondansetron (ZOFRAN) injection 4 mg (4 mg Intravenous Given 11/13/24 2054)   naloxone (NARCAN) injection 1 mg (0.4 mcg/mL Intravenous Given 11/13/24 2054)   sodium chloride 0.9 % bolus 1,000 mL (1,000 mL Intravenous New Bag 11/13/24 2223)   droperidol (INAPSINE) injection 2.5 mg (2.5 mg Intravenous Given 11/13/24 2222)           OUTPATIENT MEDICATION MANAGEMENT:  Current Facility-Administered Medications Ordered in Epic   Medication Dose Route Frequency Provider Last Rate Last Admin    acetaminophen (TYLENOL) tablet 650 mg  650 mg Oral Q4H PRN Loreto Medrano APRN        Or    acetaminophen (TYLENOL) 160 MG/5ML oral solution 650 mg  650 mg Oral Q4H PRN Loreto Medrano APRN        Or    acetaminophen (TYLENOL) suppository 650 mg  650 mg Rectal Q4H PRN Loreto Medrano APRN        aluminum-magnesium hydroxide-simethicone (MAALOX MAX) 400-400-40 MG/5ML suspension 15 mL  15 mL Oral Q6H PRN Loreto Medrano APRN        sennosides-docusate (PERICOLACE) 8.6-50 MG per tablet 2 tablet  2 tablet Oral BID PRN Loreto Medrano APRN        And    polyethylene glycol (MIRALAX) packet 17 g  17 g Oral Daily PRN Loreto Medrano APRN        And    bisacodyl (DULCOLAX) EC tablet 5 mg  5 mg Oral Daily PRN Loreto Medrano APRN        And    bisacodyl (DULCOLAX) suppository 10 mg  10 mg Rectal Daily PRN Loreto Medrano APRN        nitroglycerin (NITROSTAT) SL tablet 0.4 mg  0.4 mg Sublingual Q5 Min PRN Loreto Medrano APRN        ondansetron ODT (ZOFRAN-ODT) disintegrating tablet 4 mg  4 mg Oral Q6H PRN Loreto Medrano APRN        Or    ondansetron (ZOFRAN) injection 4 mg  4 mg Intravenous  Q6H PRN Loreto Medrano, APRN        sodium chloride 0.9 % flush 10 mL  10 mL Intravenous PRN Star Shore PA        sodium chloride 0.9 % flush 10 mL  10 mL Intravenous PRN Loreto Medrano, KEON         No current [facility]-ordered outpatient medications on file.           PROGRESS, DATA ANALYSIS, CONSULTS, AND MEDICAL DECISION MAKING  ORDERS PLACED DURING THIS VISIT:  Orders Placed This Encounter   Procedures    Laceration Repair    Blood Culture - Blood,    CT Head Without Contrast    CT Angiogram Chest Pulmonary Embolism    XR Spine Thoracic 3 View    XR Spine Lumbar Complete 4+VW    Comprehensive Metabolic Panel    High Sensitivity Troponin T    D-dimer, Quantitative    CBC Auto Differential    Urinalysis With Microscopic If Indicated (No Culture) - Urine, Clean Catch    Urine Drug Screen - Urine, Clean Catch    Ethanol    Manual Differential    Urinalysis, Microscopic Only - Urine, Clean Catch    High Sensitivity Troponin T 2Hr    Lactic Acid, Plasma    Sedimentation Rate    C-reactive Protein    Blood Gas, Arterial -    Salicylate Level    Acetaminophen Level    STAT Lactic Acid, Reflex    NPO Diet NPO Type: Strict NPO    Vital Signs    Intake & Output    Oral Care    Place Sequential Compression Device    Maintain Sequential Compression Device    Maintain IV Access    Telemetry - Place Orders & Notify Provider of Results When Patient Experiences Acute Chest Pain, Dysrhythmia or Respiratory Distress    May Be Off Telemetry for Tests    Up With Assistance    Code Status and Medical Interventions: CPR (Attempt to Resuscitate); Full Support    Inpatient Orthopedic Surgery Consult    [facility] (on-call MD unless specified) Details    Incentive Spirometry    ECG 12 Lead Syncope    Insert Peripheral IV    Initiate Observation Status    CBC & Differential       All labs have been independently interpreted by me.  All radiology studies have been reviewed by me. All EKG's have been independently viewed and interpreted  by me.  Discussion below represents my analysis of pertinent findings related to patient's condition, differential diagnosis, treatment plan and final disposition.    Differential diagnosis includes but is not limited to:   My differential diagnosis for altered mental status includes but is not limited to:  Hypoglycemia, hyperglycemia, DKA, overdose, ethanol intoxication, thiamine deficiency, niacin deficiency, hypothymia, hyperviscosity, Mitchell’s disease, hyponatremia, hypernatremia, liver failure, kidney failure, hyper or hypothyroid, no insufficiency, hypoxia, hypercarbia, carbon monoxide poisoning, postanoxic encephalopathy, ischemic stroke, intracranial bleed, subarachnoid hemorrhage, brain tumor, closed head injury, epidural hematoma, epidural hematoma, seizure activity, postictal state, syncopal episode, disseminated encephalomyelitis, central pontine myelinolysis, post cardiac arrest, bacterial meningitis, viral meningitis, fungal meningitis, encephalitis, brain abscess, subdural empyema, hysteria, catatonic state, malingering, hypertensive encephalopathy, vasculitis, TTP, DIC      ED Course:  ED Course as of 11/13/24 2342   Wed Nov 13, 2024   1851 First look: Patient presents with facial injury after fall prior to arrival.  Patient reportedly smoking marijuana and taking pain pills.  At this point he appears under the influence of drugs or alcohol.  Vitals are stable.  Back surgery 3 days ago.  Plan for basic labs, EKG, head CT. [EE]   2008 WBC(!): 18.17 [CC]   2008 THC Screen, Urine(!): Positive [CC]   2008 Oxycodone Screen, Urine(!): Positive [CC]   2008 Fentanyl, Urine(!): Positive [CC]   2008 CT Head Without Contrast  My independent interpretation of the CT of the head is no intracranial hemorrhage [CC]   2008 D-Dimer, Quant(!): 2.62  CTA pending [CC]   2024 CT Angiogram Chest Pulmonary Embolism  My independent interpretation of the CTA is no saddle PE [CC]   2024 I discussed the case with Dr. Roth  and he agrees to evaluate the patient at the bedside.    [CC]   2044 On reevaluation patient is very obtunded and diaphoretic.  He does respond to his name and sternal rub but is speaking intelligibly ABG was performed at bedside and patient is compensated..  Will give Narcan given his level of depressed mental status. [CC]   2045 The patient has snoring respirations, was unresponsive, was diaphoretic.  ABG was ordered.  Narcan was administered IV.  The patient is now awake and talking.  His diaphoresis is resolving. [TR]   2046 CT Head Without Contrast  My independent interpretation of the imaging study is no acute hemorrhage [TR]   2050 Patient woke up immediately after Narcan and is now awake, alert, oriented and answering questions.  He says that he took his prescribed pain medication today which she says is two pills every 4 hours but cannot tell me exactly what he is taking.  He denies using any fentanyl despite urine tox.  Patient says he remembers standing trying to put his shirt on and then remembers falling and hitting his head on the floor.  He is unsure if he lost consciousness.  He denies any increased back pain, fevers, chills, nausea, vomiting.  He denies headaches or dizziness.  He says he was recovering well prior to this episode today.  Will continue to monitor. [CC]   2101 C-Reactive Protein(!): 9.30 [CC]   2130 I spoke with spine fellow with [facility] who is familiar with this patient.  He said he is not concerned about the leukocytosis given the patient's recent surgery.  He will note the patient's toxic ingestion and narcotics and would like to see him in follow-up next week in the office.  He does recommend x-rays of the thoracic and lumbar spine to ensure the hardware is in proper alignment.  He says that the patient has no systemic symptoms concerning for infection and he would not be concerned about a developing infection at this time. [CC]   2139 Lactate(!!): 3.0 [CC]   2144  Sed Rate(!): 43 [CC]   2334 Spoke with Natalia APC with [facility].  Reviewed history, exam, results, treatments.  She agrees admit the patient to  .   pending at the time of admission    [CC]      ED Course User Index  [CC] Micki Wilks PA-C  [EE] Star Shore PA  [TR] Franklin Roth MD           AS OF 23:42 EST VITALS:    BP - 142/85  HR - 108  TEMP - 97.8 °F (36.6 °C)  O2 SATS - 93%      MDM:  Patient is a 54-year-old male presents emergency department today with altered mental status after apparent syncopal episode at home.  On arrival here in the emergency department patient is obtunded and appears under the influence of drugs.  He will follow some simple commands but is not able to give much history.  He was observed here in the ED for period of time but then became more obtunded and diaphoretic.  He was given Narcan and a blood gas was obtained.  After Narcan patient is awake, alert, oriented and answering all questions appropriately.  He is much more coherent and says he is taking his pain medication as prescribed and is unsure why he overdosed.  He remembers falling and hitting his head on the floor and is unsure if he had a syncopal episode or just fell.  Patient was evaluated with labs which revealed a significant leukocytosis.  Urine tox was positive for THC, oxycodone, and fentanyl.  D-dimer was elevated and given the syncopal episode he was evaluated with CT of the head and CTA of the chest.  No pulmonary embolism was identified and CT of the head is normal.  EKG is nonischemic in nature and shows no concerning arrhythmia.  Inflammatory markers are elevated and lactic acid is elevated but he did just have a massive back surgery.  I suspect these labs are reactive.  I discussed the case with his surgeon on-call and they recommended x-rays to ensure that the spinal hardware is intact.  They were not concerned about the leukocytosis or elevated inflammatory markers.  After the Narcan patient was  yelling out in pain but and avoiding opiates he was given a dose of droperidol which controlled his pain and allowed him to rest.  Will plan to admit for observation for pain control and reevaluation of medication regimen given his overdose.  Patient is stable at time of admission.          DIAGNOSIS  Final diagnoses:   Opiate overdose, accidental or unintentional, initial encounter   Syncope, unspecified syncope type   Laceration of forehead, initial encounter   Acute midline low back pain without sciatica         DISPOSITION  ED Disposition       ED Disposition   Decision to Admit    Condition   --    Comment   Level of Care: Telemetry [5]   Diagnosis: Opiate overdose [465069]   Admitting Physician: ZAINAB ALONSO [[id number]]   Attending Physician: ZAINAB ALONSO [[id number]]   Is patient appropriate for Inpatient Observation Unit?: No [0]                      Please note that portions of this document were completed with a voice recognition program.    Note Disclaimer: At [facility], we believe that sharing information builds trust and better relationships. You are receiving this note because you recently visited [facility]. It is possible you will see health information before a provider has talked with you about it. This kind of information can be easy to misunderstand. To help you fully understand what it means for your health, we urge you to discuss this note with your provider.     Micki Wilks PA-C  11/13/24 [id number]

## 2024-11-14 NOTE — PLAN OF CARE
Goal Outcome Evaluation:  Plan of Care Reviewed With: patient      Outcome Evaluation: Patient being discharged home. IV and heart monitor removed. Patient verbalizes understanding.

## 2024-11-18 LAB — BACTERIA SPEC AEROBE CULT: NORMAL

## 2024-11-19 LAB — BACTERIA SPEC AEROBE CULT: NORMAL
